# Patient Record
Sex: FEMALE | Race: WHITE | Employment: OTHER | URBAN - METROPOLITAN AREA
[De-identification: names, ages, dates, MRNs, and addresses within clinical notes are randomized per-mention and may not be internally consistent; named-entity substitution may affect disease eponyms.]

---

## 2018-08-04 ENCOUNTER — APPOINTMENT (OUTPATIENT)
Dept: GENERAL RADIOLOGY | Age: 83
End: 2018-08-04
Payer: COMMERCIAL

## 2018-08-04 ENCOUNTER — HOSPITAL ENCOUNTER (EMERGENCY)
Age: 83
Discharge: HOME | End: 2018-08-04
Attending: EMERGENCY MEDICINE
Payer: COMMERCIAL

## 2018-08-04 VITALS
BODY MASS INDEX: 36.82 KG/M2 | HEART RATE: 69 BPM | OXYGEN SATURATION: 99 % | HEIGHT: 61 IN | RESPIRATION RATE: 14 BRPM | TEMPERATURE: 99.2 F | DIASTOLIC BLOOD PRESSURE: 72 MMHG | SYSTOLIC BLOOD PRESSURE: 169 MMHG | WEIGHT: 195 LBS

## 2018-08-04 DIAGNOSIS — S42.402A LEFT ELBOW FRACTURE, CLOSED, INITIAL ENCOUNTER: Primary | ICD-10-CM

## 2018-08-04 PROCEDURE — 99283 EMERGENCY DEPT VISIT LOW MDM: CPT

## 2018-08-04 PROCEDURE — 90715 TDAP VACCINE 7 YRS/> IM: CPT | Performed by: EMERGENCY MEDICINE

## 2018-08-04 PROCEDURE — 6360000002 HC RX W HCPCS: Performed by: PREVENTIVE MEDICINE

## 2018-08-04 PROCEDURE — 73080 X-RAY EXAM OF ELBOW: CPT

## 2018-08-04 PROCEDURE — 6360000002 HC RX W HCPCS: Performed by: EMERGENCY MEDICINE

## 2018-08-04 PROCEDURE — 90471 IMMUNIZATION ADMIN: CPT | Performed by: EMERGENCY MEDICINE

## 2018-08-04 PROCEDURE — 29105 APPLICATION LONG ARM SPLINT: CPT

## 2018-08-04 PROCEDURE — 96372 THER/PROPH/DIAG INJ SC/IM: CPT

## 2018-08-04 RX ORDER — KETOROLAC TROMETHAMINE 30 MG/ML
15 INJECTION, SOLUTION INTRAMUSCULAR; INTRAVENOUS ONCE
Status: COMPLETED | OUTPATIENT
Start: 2018-08-04 | End: 2018-08-04

## 2018-08-04 RX ORDER — KETOROLAC TROMETHAMINE 10 MG/1
10 TABLET, FILM COATED ORAL EVERY 6 HOURS PRN
Qty: 10 TABLET | Refills: 0 | Status: ON HOLD | OUTPATIENT
Start: 2018-08-04 | End: 2019-06-28 | Stop reason: HOSPADM

## 2018-08-04 RX ADMIN — KETOROLAC TROMETHAMINE 15 MG: 30 INJECTION, SOLUTION INTRAMUSCULAR at 15:47

## 2018-08-04 RX ADMIN — TETANUS TOXOID, REDUCED DIPHTHERIA TOXOID AND ACELLULAR PERTUSSIS VACCINE, ADSORBED 0.5 ML: 5; 2.5; 8; 8; 2.5 SUSPENSION INTRAMUSCULAR at 16:18

## 2018-08-04 ASSESSMENT — ENCOUNTER SYMPTOMS
ALLERGIC/IMMUNOLOGIC NEGATIVE: 1
CONSTIPATION: 0
SHORTNESS OF BREATH: 0
CHEST TIGHTNESS: 0
NAUSEA: 0
ABDOMINAL PAIN: 0
VOMITING: 0
COUGH: 0
DIARRHEA: 0

## 2018-08-04 ASSESSMENT — PAIN DESCRIPTION - ORIENTATION: ORIENTATION: LEFT

## 2018-08-04 ASSESSMENT — PAIN DESCRIPTION - LOCATION
LOCATION: ARM
LOCATION: ARM

## 2018-08-04 ASSESSMENT — PAIN DESCRIPTION - PAIN TYPE
TYPE: ACUTE PAIN
TYPE: ACUTE PAIN

## 2018-08-04 ASSESSMENT — PAIN SCALES - GENERAL
PAINLEVEL_OUTOF10: 5
PAINLEVEL_OUTOF10: 10
PAINLEVEL_OUTOF10: 2

## 2018-08-04 NOTE — ED PROVIDER NOTES
80-year-old female with history of recent fall approximately 24 hours prior to arrival. The patient and the patient's son is the primary historians. They report the patient had fallen out of bed onto a hardwood floor yesterday landing directly on her left elbow. She's taken Tylenol for her pain but reports this has not significantly improved her symptoms. Currently localizes pain to the left elbow and notes a small bruise in that area. She has pain with flexion and extension of the elbow but is able to move the joint. She reports no subjective paresthesias at this time. Patient states she did not hit her head. Review of Systems   Constitutional: Negative for chills and fever. HENT: Negative for congestion. Eyes: Negative for visual disturbance. Respiratory: Negative for cough, chest tightness and shortness of breath. Cardiovascular: Negative for chest pain, palpitations and leg swelling. Gastrointestinal: Negative for abdominal pain, constipation, diarrhea, nausea and vomiting. Endocrine: Negative. Genitourinary: Negative for dysuria, frequency, hematuria and urgency. Musculoskeletal: Positive for arthralgias. Skin: Negative. Allergic/Immunologic: Negative. Neurological: Negative for dizziness, weakness and headaches. Hematological: Negative. Psychiatric/Behavioral: Negative. Physical Exam   Constitutional: She is oriented to person, place, and time. She appears well-developed and well-nourished. No distress. HENT:   Head: Normocephalic and atraumatic. Right Ear: External ear normal.   Left Ear: External ear normal.   Nose: Nose normal.   Mouth/Throat: Oropharynx is clear and moist. No oropharyngeal exudate. Eyes: Conjunctivae and EOM are normal. Pupils are equal, round, and reactive to light. Right eye exhibits no discharge. Left eye exhibits no discharge. Neck: Normal range of motion. Neck supple. No JVD present. No tracheal deviation present.  No thyromegaly present. Cardiovascular: Normal rate, regular rhythm, normal heart sounds and intact distal pulses. Exam reveals no gallop and no friction rub. No murmur heard. Pulmonary/Chest: Effort normal and breath sounds normal. No respiratory distress. She has no wheezes. She has no rales. Abdominal: Soft. Bowel sounds are normal. She exhibits no distension. There is no tenderness. There is no rebound and no guarding. Musculoskeletal: Normal range of motion. She exhibits no edema. Lymphadenopathy:     She has no cervical adenopathy. Neurological: She is alert and oriented to person, place, and time. Skin: Skin is warm and dry. She is not diaphoretic. Psychiatric: She has a normal mood and affect. Her behavior is normal. Judgment and thought content normal.   Vitals reviewed. Procedures    MDM  Number of Diagnoses or Management Options  Diagnosis management comments: 20-year-old female status post fall onto her left elbow. We'll obtain her imaging studies and pain medication at this time. ED Course as of Aug 04 1652   Sat Aug 04, 2018   1611 ATTENDING PROVIDER ATTESTATION:     I have personally performed and/or participated in the history, exam, medical decision making, and procedures and agree with all pertinent clinical information unless otherwise noted. I have also reviewed and agree with the past medical, family and social history unless otherwise noted. I have discussed this patient in detail with the resident and provided the instruction and education regarding the evidence-based evaluation and treatment of elbow pain. History: Patient fell yesterday striking her left elbow. She was initially okay but, has since developed worsening pain and a bit of swelling to that left elbow. No other areas of pain or trauma. My findings: Shilo Sanchez is a 80 y.o. female whom is in no distress. Physical exam reveals mild to moderate tenderness over the left posterior elbow.  Mild erythema over the prominence. Decreased range of motion secondary to pain. My plan: Symptomatic and supportive care. Xray    Electronically signed by Arielle Barksdale DO on 8/4/18 at 4:11 PM        [TG]      ED Course User Index  [TG] Arielle Barksdale DO       ED Course as of Aug 04 1652   Sat Aug 04, 2018   1611 ATTENDING PROVIDER ATTESTATION:     I have personally performed and/or participated in the history, exam, medical decision making, and procedures and agree with all pertinent clinical information unless otherwise noted. I have also reviewed and agree with the past medical, family and social history unless otherwise noted. I have discussed this patient in detail with the resident and provided the instruction and education regarding the evidence-based evaluation and treatment of elbow pain. History: Patient fell yesterday striking her left elbow. She was initially okay but, has since developed worsening pain and a bit of swelling to that left elbow. No other areas of pain or trauma. My findings: Beth Lainez is a 80 y.o. female whom is in no distress. Physical exam reveals mild to moderate tenderness over the left posterior elbow. Mild erythema over the prominence. Decreased range of motion secondary to pain. My plan: Symptomatic and supportive care. Xray    Electronically signed by Arielle Barksdale DO on 8/4/18 at 4:11 PM        [TG]      ED Course User Index  [TG] Arielle Barksdale DO       --------------------------------------------- PAST HISTORY ---------------------------------------------  Past Medical History:  has no past medical history on file. Past Surgical History:  has no past surgical history on file. Social History:  reports that she has never smoked. She has never used smokeless tobacco. She reports that she does not use drugs. Family History: family history is not on file. The patients home medications have been reviewed.     Allergies: Celexa [citalopram hydrobromide]; Ciprofloxacin; Keflex [cephalexin]; Sulfa antibiotics; and Victoza [liraglutide]    -------------------------------------------------- RESULTS -------------------------------------------------  Labs:  No results found for this visit on 08/04/18. Radiology:  XR ELBOW LEFT (MIN 3 VIEWS)   Final Result   Fracture of the radial head             ------------------------- NURSING NOTES AND VITALS REVIEWED ---------------------------  Date / Time Roomed:  8/4/2018  3:24 PM  ED Bed Assignment:  23/23    The nursing notes within the ED encounter and vital signs as below have been reviewed. BP (!) 169/72   Pulse 69   Temp 99.2 °F (37.3 °C) (Oral)   Resp 14   Ht 5' 1\" (1.549 m)   Wt 195 lb (88.5 kg)   SpO2 99%   BMI 36.84 kg/m²   Oxygen Saturation Interpretation: Normal      ------------------------------------------ PROGRESS NOTES ------------------------------------------  4:52 PM  I have spoken with the patient and family and discussed todays results, in addition to providing specific details for the plan of care and counseling regarding the diagnosis and prognosis. Their questions are answered at this time and they are agreeable with the plan. I discussed at length with them reasons for immediate return here for re evaluation. They will followup with their primary care physician and orthopedic physician by calling their office on Monday.      --------------------------------- ADDITIONAL PROVIDER NOTES ---------------------------------  At this time the patient is without objective evidence of an acute process requiring hospitalization or inpatient management. They have remained hemodynamically stable throughout their entire ED visit and are stable for discharge with outpatient follow-up. The plan has been discussed in detail and they are aware of the specific conditions for emergent return, as well as the importance of follow-up.       New Prescriptions    KETOROLAC (TORADOL) 10 MG TABLET Take 1 tablet by mouth every 6 hours as needed for Pain       Diagnosis:  1. Left elbow fracture, closed, initial encounter        Disposition:  Patient's disposition: Discharge to home  Patient's condition is stable.        Arielle Barksdale [place]  08/04/18 0728

## 2018-09-13 ENCOUNTER — OFFICE VISIT (OUTPATIENT)
Dept: ORTHOPEDIC SURGERY | Age: 83
End: 2018-09-13
Payer: COMMERCIAL

## 2018-09-13 VITALS
DIASTOLIC BLOOD PRESSURE: 64 MMHG | HEART RATE: 57 BPM | TEMPERATURE: 98.4 F | WEIGHT: 193 LBS | SYSTOLIC BLOOD PRESSURE: 128 MMHG | BODY MASS INDEX: 36.44 KG/M2 | HEIGHT: 61 IN

## 2018-09-13 DIAGNOSIS — S52.125A CLOSED NONDISPLACED FRACTURE OF HEAD OF LEFT RADIUS, INITIAL ENCOUNTER: Primary | ICD-10-CM

## 2018-09-13 PROCEDURE — 99203 OFFICE O/P NEW LOW 30 MIN: CPT | Performed by: ORTHOPAEDIC SURGERY

## 2019-04-10 LAB
BASOPHILS ABSOLUTE: NORMAL
BASOPHILS RELATIVE PERCENT: NORMAL
EOSINOPHILS ABSOLUTE: NORMAL
EOSINOPHILS RELATIVE PERCENT: NORMAL
HCT VFR BLD CALC: NORMAL %
HEMOGLOBIN: NORMAL
LYMPHOCYTES ABSOLUTE: NORMAL
LYMPHOCYTES RELATIVE PERCENT: NORMAL
MCH RBC QN AUTO: NORMAL PG
MCHC RBC AUTO-ENTMCNC: NORMAL G/DL
MCV RBC AUTO: NORMAL FL
MONOCYTES ABSOLUTE: NORMAL
MONOCYTES RELATIVE PERCENT: NORMAL
NEUTROPHILS ABSOLUTE: NORMAL
NEUTROPHILS RELATIVE PERCENT: NORMAL
PLATELET # BLD: NORMAL 10*3/UL
PMV BLD AUTO: NORMAL FL
RBC # BLD: NORMAL 10*6/UL
WBC # BLD: NORMAL 10*3/UL

## 2019-04-11 LAB
AVERAGE GLUCOSE: 143
HBA1C MFR BLD: 6.6 %

## 2019-06-22 ENCOUNTER — APPOINTMENT (OUTPATIENT)
Dept: CT IMAGING | Age: 84
End: 2019-06-22
Payer: MEDICARE

## 2019-06-22 ENCOUNTER — HOSPITAL ENCOUNTER (EMERGENCY)
Age: 84
Discharge: HOME | End: 2019-06-22
Attending: EMERGENCY MEDICINE
Payer: MEDICARE

## 2019-06-22 VITALS
OXYGEN SATURATION: 95 % | SYSTOLIC BLOOD PRESSURE: 162 MMHG | BODY MASS INDEX: 34.36 KG/M2 | DIASTOLIC BLOOD PRESSURE: 87 MMHG | WEIGHT: 182 LBS | HEART RATE: 85 BPM | HEIGHT: 61 IN | TEMPERATURE: 98.3 F | RESPIRATION RATE: 16 BRPM

## 2019-06-22 DIAGNOSIS — E86.0 DEHYDRATION, MILD: ICD-10-CM

## 2019-06-22 DIAGNOSIS — R42 DIZZINESS: ICD-10-CM

## 2019-06-22 DIAGNOSIS — J01.30 ACUTE SPHENOIDAL SINUSITIS, RECURRENCE NOT SPECIFIED: ICD-10-CM

## 2019-06-22 DIAGNOSIS — H81.10 BENIGN PAROXYSMAL POSITIONAL VERTIGO, UNSPECIFIED LATERALITY: Primary | ICD-10-CM

## 2019-06-22 LAB
ANION GAP SERPL CALCULATED.3IONS-SCNC: 12 MMOL/L (ref 7–16)
BASOPHILS ABSOLUTE: 0.07 E9/L (ref 0–0.2)
BASOPHILS RELATIVE PERCENT: 0.7 % (ref 0–2)
BILIRUBIN URINE: NEGATIVE
BLOOD, URINE: NEGATIVE
BUN BLDV-MCNC: 17 MG/DL (ref 8–23)
CALCIUM SERPL-MCNC: 9.6 MG/DL (ref 8.6–10.2)
CHLORIDE BLD-SCNC: 101 MMOL/L (ref 98–107)
CLARITY: CLEAR
CO2: 26 MMOL/L (ref 22–29)
COLOR: YELLOW
CREAT SERPL-MCNC: 0.8 MG/DL (ref 0.5–1)
EOSINOPHILS ABSOLUTE: 0.15 E9/L (ref 0.05–0.5)
EOSINOPHILS RELATIVE PERCENT: 1.6 % (ref 0–6)
GFR AFRICAN AMERICAN: >60
GFR NON-AFRICAN AMERICAN: >60 ML/MIN/1.73
GLUCOSE BLD-MCNC: 88 MG/DL (ref 74–99)
GLUCOSE URINE: NEGATIVE MG/DL
HCT VFR BLD CALC: 34.7 % (ref 34–48)
HEMOGLOBIN: 12.3 G/DL (ref 11.5–15.5)
IMMATURE GRANULOCYTES #: 0.02 E9/L
IMMATURE GRANULOCYTES %: 0.2 % (ref 0–5)
KETONES, URINE: NEGATIVE MG/DL
LEUKOCYTE ESTERASE, URINE: NEGATIVE
LYMPHOCYTES ABSOLUTE: 3.53 E9/L (ref 1.5–4)
LYMPHOCYTES RELATIVE PERCENT: 36.5 % (ref 20–42)
MCH RBC QN AUTO: 32.6 PG (ref 26–35)
MCHC RBC AUTO-ENTMCNC: 35.4 % (ref 32–34.5)
MCV RBC AUTO: 92 FL (ref 80–99.9)
MONOCYTES ABSOLUTE: 0.83 E9/L (ref 0.1–0.95)
MONOCYTES RELATIVE PERCENT: 8.6 % (ref 2–12)
NEUTROPHILS ABSOLUTE: 5.07 E9/L (ref 1.8–7.3)
NEUTROPHILS RELATIVE PERCENT: 52.4 % (ref 43–80)
NITRITE, URINE: NEGATIVE
PDW BLD-RTO: 12.9 FL (ref 11.5–15)
PH UA: 5.5 (ref 5–9)
PLATELET # BLD: 164 E9/L (ref 130–450)
PMV BLD AUTO: 11.8 FL (ref 7–12)
POTASSIUM SERPL-SCNC: 4.1 MMOL/L (ref 3.5–5)
PROTEIN UA: NEGATIVE MG/DL
RBC # BLD: 3.77 E12/L (ref 3.5–5.5)
SODIUM BLD-SCNC: 139 MMOL/L (ref 132–146)
SPECIFIC GRAVITY UA: <=1.005 (ref 1–1.03)
TROPONIN: <0.01 NG/ML (ref 0–0.03)
UROBILINOGEN, URINE: 0.2 E.U./DL
WBC # BLD: 9.7 E9/L (ref 4.5–11.5)

## 2019-06-22 PROCEDURE — 81003 URINALYSIS AUTO W/O SCOPE: CPT

## 2019-06-22 PROCEDURE — 80048 BASIC METABOLIC PNL TOTAL CA: CPT

## 2019-06-22 PROCEDURE — 85025 COMPLETE CBC W/AUTO DIFF WBC: CPT

## 2019-06-22 PROCEDURE — 70450 CT HEAD/BRAIN W/O DYE: CPT

## 2019-06-22 PROCEDURE — 6370000000 HC RX 637 (ALT 250 FOR IP): Performed by: EMERGENCY MEDICINE

## 2019-06-22 PROCEDURE — 99284 EMERGENCY DEPT VISIT MOD MDM: CPT

## 2019-06-22 PROCEDURE — 2580000003 HC RX 258: Performed by: EMERGENCY MEDICINE

## 2019-06-22 PROCEDURE — 84484 ASSAY OF TROPONIN QUANT: CPT

## 2019-06-22 PROCEDURE — 96360 HYDRATION IV INFUSION INIT: CPT

## 2019-06-22 RX ORDER — SODIUM CHLORIDE 9 MG/ML
500 INJECTION, SOLUTION INTRAVENOUS CONTINUOUS
Status: DISCONTINUED | OUTPATIENT
Start: 2019-06-22 | End: 2019-06-22 | Stop reason: HOSPADM

## 2019-06-22 RX ORDER — MECLIZINE HCL 12.5 MG/1
25 TABLET ORAL ONCE
Status: DISCONTINUED | OUTPATIENT
Start: 2019-06-22 | End: 2019-06-22

## 2019-06-22 RX ORDER — DOXYCYCLINE HYCLATE 100 MG
100 TABLET ORAL 2 TIMES DAILY
Qty: 20 TABLET | Refills: 0 | Status: SHIPPED | OUTPATIENT
Start: 2019-06-22 | End: 2019-07-02

## 2019-06-22 RX ORDER — ONDANSETRON 4 MG/1
8 TABLET, ORALLY DISINTEGRATING ORAL ONCE
Status: DISCONTINUED | OUTPATIENT
Start: 2019-06-22 | End: 2019-06-22 | Stop reason: HOSPADM

## 2019-06-22 RX ORDER — DOXYCYCLINE HYCLATE 100 MG/1
100 CAPSULE ORAL ONCE
Status: COMPLETED | OUTPATIENT
Start: 2019-06-22 | End: 2019-06-22

## 2019-06-22 RX ADMIN — DOXYCYCLINE HYCLATE 100 MG: 100 CAPSULE ORAL at 20:27

## 2019-06-22 RX ADMIN — SODIUM CHLORIDE 500 ML: 9 INJECTION, SOLUTION INTRAVENOUS at 19:18

## 2019-06-22 NOTE — ED NOTES
Bed: 12  Expected date:   Expected time:   Means of arrival:   Comments:  Wallace Marie, RN  06/22/19 0662

## 2019-06-22 NOTE — ED NOTES
Bed:  OGMEZ-03  Expected date:   Expected time:   Means of arrival:   Comments:  Blossom [place] RN  06/22/19 [id number]

## 2019-06-22 NOTE — ED PROVIDER NOTES
HPI:  6/22/19, Time: 6:54 PM        Blu Napier is a 80 y.o. female presenting to the ED for dizziness beginning 1 hour ago. The complaint has been intermittent, mild in severity, and worsened by standing occasionally. Pt. Has taken Antivert at home without any improvement of her symptoms. Pt. Has not had any reported chest heaviness/pressure/tightness. No reported fever/chills, no focal extremity weakness, no vomiting, no other complaints. No other complaints reported. No aggravating nor relieving factors reported. Family members state that the patient has occasionally complained of frontal headaches. No neck stiffness nor rashes are reported. Review of Systems:   Pertinent positives and negatives are stated within HPI, all other systems reviewed and are negative.      --------------------------------------------- PAST HISTORY ---------------------------------------------  Past Medical History:  has a past medical history of Diabetes mellitus ([facility] [address] Past Surgical History:  has no past surgical history on file. Social History:  reports that she has never smoked. She has never used smokeless tobacco. She reports that she does not drink alcohol or use drugs. Family History: family history is not on file. The patients home medications have been reviewed. Allergies: Celexa [citalopram hydrobromide]; Ciprofloxacin; Keflex [cephalexin];  Sulfa antibiotics; and Victoza [liraglutide]    -------------------------------------------------- RESULTS -------------------------------------------------  All laboratory and radiology results have been personally reviewed by myself   LABS:  Results for orders placed or performed during the hospital encounter of 06/22/19   Troponin   Result Value Ref Range    Troponin <0.01 0.00 - 0.03 ng/mL   Basic Metabolic Panel   Result Value Ref Range    Sodium 139 132 - 146 mmol/L    Potassium 4.1 3.5 - 5.0 mmol/L    Chloride 101 98 - 107 mmol/L    CO2 26 22 - 29 mmol/L    Anion Gap 12 7 - 16 mmol/L    Glucose 88 74 - 99 mg/dL    BUN 17 8 - 23 mg/dL    CREATININE 0.8 0.5 - 1.0 mg/dL    GFR Non-African American >60 >=60 mL/min/1.73    GFR African American >60     Calcium 9.6 8.6 - 10.2 mg/dL   CBC Auto Differential   Result Value Ref Range    WBC 9.7 4.5 - 11.5 E9/L    RBC 3.77 3.50 - 5.50 E12/L    Hemoglobin 12.3 11.5 - 15.5 g/dL    Hematocrit 34.7 34.0 - 48.0 %    MCV 92.0 80.0 - 99.9 fL    MCH 32.6 26.0 - 35.0 pg    MCHC 35.4 (H) 32.0 - 34.5 %    RDW 12.9 11.5 - 15.0 fL    Platelets 380 487 - 161 E9/L    MPV 11.8 7.0 - 12.0 fL    Neutrophils % 52.4 43.0 - 80.0 %    Immature Granulocytes % 0.2 0.0 - 5.0 %    Lymphocytes % 36.5 20.0 - 42.0 %    Monocytes % 8.6 2.0 - 12.0 %    Eosinophils % 1.6 0.0 - 6.0 %    Basophils % 0.7 0.0 - 2.0 %    Neutrophils # 5.07 1.80 - 7.30 E9/L    Immature Granulocytes # 0.02 E9/L    Lymphocytes # 3.53 1.50 - 4.00 E9/L    Monocytes # 0.83 0.10 - 0.95 E9/L    Eosinophils # 0.15 0.05 - 0.50 E9/L    Basophils # 0.07 0.00 - 0.20 E9/L   Urinalysis   Result Value Ref Range    Color, UA Yellow Straw/Yellow    Clarity, UA Clear Clear    Glucose, Ur Negative Negative mg/dL    Bilirubin Urine Negative Negative    Ketones, Urine Negative Negative mg/dL    Specific Gravity, UA <=1.005 1.005 - 1.030    Blood, Urine Negative Negative    pH, UA 5.5 5.0 - 9.0    Protein, UA Negative Negative mg/dL    Urobilinogen, Urine 0.2 <2.0 E.U./dL    Nitrite, Urine Negative Negative    Leukocyte Esterase, Urine Negative Negative       RADIOLOGY:  Interpreted by Radiologist.  CT HEAD WO CONTRAST   Final Result   Fluid within the sphenoid sinus compatible with sinusitis in the   appropriate clinical setting.             ------------------------- NURSING NOTES AND VITALS REVIEWED ---------------------------   The nursing notes within the ED encounter and vital signs as below have been reviewed.    BP (!) 162/87   Pulse 85   Temp 98.3 °F (36.8 °C) (Oral)   Resp 16   Ht 5' recurrence not specified        DISPOSITION  Disposition: Discharge to home  Patient condition is stable      NOTE: This report was transcribed using voice recognition software.  Every effort was made to ensure accuracy; however, inadvertent computerized transcription errors may be present        Josephine Phoenix, MD  06/22/19 0350

## 2019-06-26 ENCOUNTER — HOSPITAL ENCOUNTER (OUTPATIENT)
Age: 84
Setting detail: OBSERVATION
Discharge: HOME | End: 2019-06-28
Attending: EMERGENCY MEDICINE | Admitting: INTERNAL MEDICINE
Payer: MEDICARE

## 2019-06-26 ENCOUNTER — APPOINTMENT (OUTPATIENT)
Dept: CT IMAGING | Age: 84
End: 2019-06-26
Payer: MEDICARE

## 2019-06-26 ENCOUNTER — APPOINTMENT (OUTPATIENT)
Dept: GENERAL RADIOLOGY | Age: 84
End: 2019-06-26
Payer: MEDICARE

## 2019-06-26 ENCOUNTER — APPOINTMENT (OUTPATIENT)
Dept: ULTRASOUND IMAGING | Age: 84
End: 2019-06-26
Payer: MEDICARE

## 2019-06-26 DIAGNOSIS — E08.01 DIABETES MELLITUS DUE TO UNDERLYING CONDITION WITH HYPEROSMOLARITY AND COMA, WITH LONG-TERM CURRENT USE OF INSULIN (HCC): ICD-10-CM

## 2019-06-26 DIAGNOSIS — R42 DIZZINESS: Primary | ICD-10-CM

## 2019-06-26 DIAGNOSIS — Z79.4 DIABETES MELLITUS DUE TO UNDERLYING CONDITION WITH HYPEROSMOLARITY AND COMA, WITH LONG-TERM CURRENT USE OF INSULIN (HCC): ICD-10-CM

## 2019-06-26 DIAGNOSIS — R42 LIGHTHEADEDNESS: ICD-10-CM

## 2019-06-26 DIAGNOSIS — F41.0 PANIC DISORDER: ICD-10-CM

## 2019-06-26 DIAGNOSIS — R42 VERTIGO: ICD-10-CM

## 2019-06-26 DIAGNOSIS — R51.9 NONINTRACTABLE HEADACHE, UNSPECIFIED CHRONICITY PATTERN, UNSPECIFIED HEADACHE TYPE: ICD-10-CM

## 2019-06-26 PROBLEM — I35.1 AORTIC REGURGITATION: Status: ACTIVE | Noted: 2017-01-01

## 2019-06-26 LAB
ALBUMIN SERPL-MCNC: 4.4 G/DL (ref 3.5–5.2)
ALP BLD-CCNC: 51 U/L (ref 35–104)
ALT SERPL-CCNC: 16 U/L (ref 0–32)
ANION GAP SERPL CALCULATED.3IONS-SCNC: 14 MMOL/L (ref 7–16)
AST SERPL-CCNC: 26 U/L (ref 0–31)
BASOPHILS ABSOLUTE: 0.09 E9/L (ref 0–0.2)
BASOPHILS RELATIVE PERCENT: 0.9 % (ref 0–2)
BILIRUB SERPL-MCNC: 0.3 MG/DL (ref 0–1.2)
BUN BLDV-MCNC: 20 MG/DL (ref 8–23)
CALCIUM SERPL-MCNC: 9.4 MG/DL (ref 8.6–10.2)
CHLORIDE BLD-SCNC: 102 MMOL/L (ref 98–107)
CHP ED QC CHECK: NORMAL
CO2: 25 MMOL/L (ref 22–29)
CREAT SERPL-MCNC: 0.9 MG/DL (ref 0.5–1)
EKG ATRIAL RATE: 78 BPM
EKG P AXIS: 15 DEGREES
EKG P-R INTERVAL: 248 MS
EKG Q-T INTERVAL: 392 MS
EKG QRS DURATION: 100 MS
EKG QTC CALCULATION (BAZETT): 446 MS
EKG R AXIS: -42 DEGREES
EKG T AXIS: 29 DEGREES
EKG VENTRICULAR RATE: 78 BPM
EOSINOPHILS ABSOLUTE: 0.14 E9/L (ref 0.05–0.5)
EOSINOPHILS RELATIVE PERCENT: 1.4 % (ref 0–6)
GFR AFRICAN AMERICAN: >60
GFR NON-AFRICAN AMERICAN: 59 ML/MIN/1.73
GLUCOSE BLD-MCNC: 142 MG/DL
GLUCOSE BLD-MCNC: 146 MG/DL (ref 74–99)
HCT VFR BLD CALC: 35.8 % (ref 34–48)
HEMOGLOBIN: 12.2 G/DL (ref 11.5–15.5)
IMMATURE GRANULOCYTES #: 0.02 E9/L
IMMATURE GRANULOCYTES %: 0.2 % (ref 0–5)
LACTIC ACID, SEPSIS: 2.3 MMOL/L (ref 0.5–1.9)
LYMPHOCYTES ABSOLUTE: 3.25 E9/L (ref 1.5–4)
LYMPHOCYTES RELATIVE PERCENT: 33.5 % (ref 20–42)
MCH RBC QN AUTO: 31.9 PG (ref 26–35)
MCHC RBC AUTO-ENTMCNC: 34.1 % (ref 32–34.5)
MCV RBC AUTO: 93.5 FL (ref 80–99.9)
METER GLUCOSE: 142 MG/DL (ref 74–99)
METER GLUCOSE: 156 MG/DL (ref 74–99)
MONOCYTES ABSOLUTE: 0.73 E9/L (ref 0.1–0.95)
MONOCYTES RELATIVE PERCENT: 7.5 % (ref 2–12)
NEUTROPHILS ABSOLUTE: 5.48 E9/L (ref 1.8–7.3)
NEUTROPHILS RELATIVE PERCENT: 56.5 % (ref 43–80)
PDW BLD-RTO: 12.8 FL (ref 11.5–15)
PLATELET # BLD: 183 E9/L (ref 130–450)
PMV BLD AUTO: 12.2 FL (ref 7–12)
POTASSIUM SERPL-SCNC: 4.2 MMOL/L (ref 3.5–5)
PRO-BNP: 79 PG/ML (ref 0–450)
RBC # BLD: 3.83 E12/L (ref 3.5–5.5)
SEDIMENTATION RATE, ERYTHROCYTE: 32 MM/HR (ref 0–20)
SODIUM BLD-SCNC: 141 MMOL/L (ref 132–146)
TOTAL CK: 99 U/L (ref 20–180)
TOTAL PROTEIN: 7.8 G/DL (ref 6.4–8.3)
TROPONIN: <0.01 NG/ML (ref 0–0.03)
WBC # BLD: 9.7 E9/L (ref 4.5–11.5)

## 2019-06-26 PROCEDURE — 93880 EXTRACRANIAL BILAT STUDY: CPT

## 2019-06-26 PROCEDURE — 84484 ASSAY OF TROPONIN QUANT: CPT

## 2019-06-26 PROCEDURE — 96374 THER/PROPH/DIAG INJ IV PUSH: CPT

## 2019-06-26 PROCEDURE — 36415 COLL VENOUS BLD VENIPUNCTURE: CPT

## 2019-06-26 PROCEDURE — 86304 IMMUNOASSAY TUMOR CA 125: CPT

## 2019-06-26 PROCEDURE — 81001 URINALYSIS AUTO W/SCOPE: CPT

## 2019-06-26 PROCEDURE — 84145 PROCALCITONIN (PCT): CPT

## 2019-06-26 PROCEDURE — 6360000004 HC RX CONTRAST MEDICATION: Performed by: RADIOLOGY

## 2019-06-26 PROCEDURE — 82962 GLUCOSE BLOOD TEST: CPT

## 2019-06-26 PROCEDURE — 87633 RESP VIRUS 12-25 TARGETS: CPT

## 2019-06-26 PROCEDURE — 6360000002 HC RX W HCPCS: Performed by: EMERGENCY MEDICINE

## 2019-06-26 PROCEDURE — 86301 IMMUNOASSAY TUMOR CA 19-9: CPT

## 2019-06-26 PROCEDURE — G0378 HOSPITAL OBSERVATION PER HR: HCPCS

## 2019-06-26 PROCEDURE — 87581 M.PNEUMON DNA AMP PROBE: CPT

## 2019-06-26 PROCEDURE — 82378 CARCINOEMBRYONIC ANTIGEN: CPT

## 2019-06-26 PROCEDURE — 71045 X-RAY EXAM CHEST 1 VIEW: CPT

## 2019-06-26 PROCEDURE — 6360000002 HC RX W HCPCS: Performed by: INTERNAL MEDICINE

## 2019-06-26 PROCEDURE — 83605 ASSAY OF LACTIC ACID: CPT

## 2019-06-26 PROCEDURE — 93010 ELECTROCARDIOGRAM REPORT: CPT | Performed by: INTERNAL MEDICINE

## 2019-06-26 PROCEDURE — 2580000003 HC RX 258: Performed by: EMERGENCY MEDICINE

## 2019-06-26 PROCEDURE — 93005 ELECTROCARDIOGRAM TRACING: CPT | Performed by: EMERGENCY MEDICINE

## 2019-06-26 PROCEDURE — 2580000003 HC RX 258: Performed by: INTERNAL MEDICINE

## 2019-06-26 PROCEDURE — 86140 C-REACTIVE PROTEIN: CPT

## 2019-06-26 PROCEDURE — 87088 URINE BACTERIA CULTURE: CPT

## 2019-06-26 PROCEDURE — 94760 N-INVAS EAR/PLS OXIMETRY 1: CPT

## 2019-06-26 PROCEDURE — 83036 HEMOGLOBIN GLYCOSYLATED A1C: CPT

## 2019-06-26 PROCEDURE — 80053 COMPREHEN METABOLIC PANEL: CPT

## 2019-06-26 PROCEDURE — 87486 CHLMYD PNEUM DNA AMP PROBE: CPT

## 2019-06-26 PROCEDURE — 85651 RBC SED RATE NONAUTOMATED: CPT

## 2019-06-26 PROCEDURE — 82550 ASSAY OF CK (CPK): CPT

## 2019-06-26 PROCEDURE — 2580000003 HC RX 258: Performed by: RADIOLOGY

## 2019-06-26 PROCEDURE — 82746 ASSAY OF FOLIC ACID SERUM: CPT

## 2019-06-26 PROCEDURE — 83935 ASSAY OF URINE OSMOLALITY: CPT

## 2019-06-26 PROCEDURE — 87040 BLOOD CULTURE FOR BACTERIA: CPT

## 2019-06-26 PROCEDURE — 87798 DETECT AGENT NOS DNA AMP: CPT

## 2019-06-26 PROCEDURE — 83880 ASSAY OF NATRIURETIC PEPTIDE: CPT

## 2019-06-26 PROCEDURE — 71275 CT ANGIOGRAPHY CHEST: CPT

## 2019-06-26 PROCEDURE — 96372 THER/PROPH/DIAG INJ SC/IM: CPT

## 2019-06-26 PROCEDURE — 82607 VITAMIN B-12: CPT

## 2019-06-26 PROCEDURE — 85025 COMPLETE CBC W/AUTO DIFF WBC: CPT

## 2019-06-26 PROCEDURE — 6370000000 HC RX 637 (ALT 250 FOR IP): Performed by: INTERNAL MEDICINE

## 2019-06-26 PROCEDURE — 99285 EMERGENCY DEPT VISIT HI MDM: CPT

## 2019-06-26 RX ORDER — ALBUTEROL SULFATE 0.63 MG/3ML
1 SOLUTION RESPIRATORY (INHALATION) EVERY 12 HOURS PRN
COMMUNITY

## 2019-06-26 RX ORDER — FAMOTIDINE 20 MG/1
20 TABLET, FILM COATED ORAL 2 TIMES DAILY
COMMUNITY

## 2019-06-26 RX ORDER — SUCRALFATE 1 G/1
1 TABLET ORAL 2 TIMES DAILY
COMMUNITY

## 2019-06-26 RX ORDER — 0.9 % SODIUM CHLORIDE 0.9 %
500 INTRAVENOUS SOLUTION INTRAVENOUS ONCE
Status: COMPLETED | OUTPATIENT
Start: 2019-06-26 | End: 2019-06-27

## 2019-06-26 RX ORDER — ALBUTEROL SULFATE 0.63 MG/3ML
1 SOLUTION RESPIRATORY (INHALATION) EVERY 12 HOURS PRN
Status: DISCONTINUED | OUTPATIENT
Start: 2019-06-26 | End: 2019-06-28 | Stop reason: HOSPADM

## 2019-06-26 RX ORDER — BUMETANIDE 1 MG/1
0.5 TABLET ORAL DAILY
Status: DISCONTINUED | OUTPATIENT
Start: 2019-06-26 | End: 2019-06-28 | Stop reason: HOSPADM

## 2019-06-26 RX ORDER — ESCITALOPRAM OXALATE 10 MG/1
20 TABLET ORAL DAILY
Status: DISCONTINUED | OUTPATIENT
Start: 2019-06-26 | End: 2019-06-28 | Stop reason: HOSPADM

## 2019-06-26 RX ORDER — SODIUM CHLORIDE 0.9 % (FLUSH) 0.9 %
10 SYRINGE (ML) INJECTION PRN
Status: COMPLETED | OUTPATIENT
Start: 2019-06-26 | End: 2019-06-26

## 2019-06-26 RX ORDER — LORAZEPAM 2 MG/ML
0.5 INJECTION INTRAMUSCULAR ONCE
Status: COMPLETED | OUTPATIENT
Start: 2019-06-26 | End: 2019-06-27

## 2019-06-26 RX ORDER — SUCRALFATE 1 G/1
1 TABLET ORAL 2 TIMES DAILY
Status: DISCONTINUED | OUTPATIENT
Start: 2019-06-26 | End: 2019-06-28 | Stop reason: HOSPADM

## 2019-06-26 RX ORDER — SODIUM CHLORIDE 9 MG/ML
INJECTION, SOLUTION INTRAVENOUS CONTINUOUS
Status: DISCONTINUED | OUTPATIENT
Start: 2019-06-26 | End: 2019-06-28 | Stop reason: HOSPADM

## 2019-06-26 RX ORDER — HYDROXYZINE HYDROCHLORIDE 10 MG/1
10 TABLET, FILM COATED ORAL DAILY
Status: DISCONTINUED | OUTPATIENT
Start: 2019-06-26 | End: 2019-06-28 | Stop reason: HOSPADM

## 2019-06-26 RX ORDER — ESCITALOPRAM OXALATE 20 MG/1
20 TABLET ORAL DAILY
COMMUNITY

## 2019-06-26 RX ORDER — FAMOTIDINE 20 MG/1
20 TABLET, FILM COATED ORAL 2 TIMES DAILY
Status: DISCONTINUED | OUTPATIENT
Start: 2019-06-26 | End: 2019-06-28 | Stop reason: HOSPADM

## 2019-06-26 RX ORDER — BUSPIRONE HYDROCHLORIDE 10 MG/1
10 TABLET ORAL 3 TIMES DAILY
Status: DISCONTINUED | OUTPATIENT
Start: 2019-06-26 | End: 2019-06-28 | Stop reason: HOSPADM

## 2019-06-26 RX ORDER — LORATADINE 10 MG/1
10 CAPSULE, LIQUID FILLED ORAL DAILY
COMMUNITY

## 2019-06-26 RX ORDER — KETOROLAC TROMETHAMINE 30 MG/ML
15 INJECTION, SOLUTION INTRAMUSCULAR; INTRAVENOUS ONCE
Status: COMPLETED | OUTPATIENT
Start: 2019-06-26 | End: 2019-06-26

## 2019-06-26 RX ORDER — DEXTROSE MONOHYDRATE 25 G/50ML
12.5 INJECTION, SOLUTION INTRAVENOUS PRN
Status: DISCONTINUED | OUTPATIENT
Start: 2019-06-26 | End: 2019-06-28 | Stop reason: HOSPADM

## 2019-06-26 RX ORDER — BUSPIRONE HYDROCHLORIDE 10 MG/1
10 TABLET ORAL 3 TIMES DAILY
COMMUNITY

## 2019-06-26 RX ORDER — HYDROXYZINE HYDROCHLORIDE 10 MG/1
10 TABLET, FILM COATED ORAL DAILY
COMMUNITY

## 2019-06-26 RX ORDER — L-METHYLFOLATE-ALGAE-VIT B12-B6 CAP 3-90.314-2-35 MG 3-90.314-2-35 MG
1 CAP ORAL DAILY
Status: DISCONTINUED | OUTPATIENT
Start: 2019-06-26 | End: 2019-06-28 | Stop reason: HOSPADM

## 2019-06-26 RX ORDER — BUMETANIDE 0.5 MG/1
0.5 TABLET ORAL DAILY
COMMUNITY

## 2019-06-26 RX ORDER — GLIPIZIDE 5 MG/1
5 TABLET ORAL
COMMUNITY

## 2019-06-26 RX ORDER — ALBUTEROL SULFATE 90 UG/1
2 AEROSOL, METERED RESPIRATORY (INHALATION) DAILY PRN
COMMUNITY

## 2019-06-26 RX ORDER — L-METHYLFOLATE-ALGAE-VIT B12-B6 CAP 3-90.314-2-35 MG 3-90.314-2-35 MG
1 CAP ORAL DAILY
COMMUNITY

## 2019-06-26 RX ORDER — ACETAMINOPHEN 325 MG/1
162.5 TABLET ORAL 3 TIMES DAILY
Status: DISCONTINUED | OUTPATIENT
Start: 2019-06-26 | End: 2019-06-28 | Stop reason: HOSPADM

## 2019-06-26 RX ORDER — GABAPENTIN 100 MG/1
100 CAPSULE ORAL 3 TIMES DAILY
COMMUNITY

## 2019-06-26 RX ORDER — TRAMADOL HYDROCHLORIDE 50 MG/1
25 TABLET ORAL 3 TIMES DAILY
Status: DISCONTINUED | OUTPATIENT
Start: 2019-06-26 | End: 2019-06-28 | Stop reason: HOSPADM

## 2019-06-26 RX ORDER — ONDANSETRON 2 MG/ML
4 INJECTION INTRAMUSCULAR; INTRAVENOUS EVERY 6 HOURS PRN
Status: DISCONTINUED | OUTPATIENT
Start: 2019-06-26 | End: 2019-06-28 | Stop reason: HOSPADM

## 2019-06-26 RX ORDER — DEXTROSE MONOHYDRATE 50 MG/ML
100 INJECTION, SOLUTION INTRAVENOUS PRN
Status: DISCONTINUED | OUTPATIENT
Start: 2019-06-26 | End: 2019-06-28 | Stop reason: HOSPADM

## 2019-06-26 RX ORDER — GLIPIZIDE 5 MG/1
5 TABLET ORAL
Status: DISCONTINUED | OUTPATIENT
Start: 2019-06-27 | End: 2019-06-28 | Stop reason: HOSPADM

## 2019-06-26 RX ORDER — NICOTINE POLACRILEX 4 MG
15 LOZENGE BUCCAL PRN
Status: DISCONTINUED | OUTPATIENT
Start: 2019-06-26 | End: 2019-06-28 | Stop reason: HOSPADM

## 2019-06-26 RX ORDER — CETIRIZINE HYDROCHLORIDE 10 MG/1
5 TABLET ORAL DAILY
Status: DISCONTINUED | OUTPATIENT
Start: 2019-06-26 | End: 2019-06-28 | Stop reason: HOSPADM

## 2019-06-26 RX ADMIN — BUSPIRONE HYDROCHLORIDE 10 MG: 10 TABLET ORAL at 22:36

## 2019-06-26 RX ADMIN — SODIUM CHLORIDE 500 ML: 9 INJECTION, SOLUTION INTRAVENOUS at 16:34

## 2019-06-26 RX ADMIN — IOPAMIDOL 70 ML: 755 INJECTION, SOLUTION INTRAVENOUS at 17:11

## 2019-06-26 RX ADMIN — SUCRALFATE 1 G: 1 TABLET ORAL at 22:28

## 2019-06-26 RX ADMIN — ACETAMINOPHEN 162.5 MG: 325 TABLET ORAL at 22:25

## 2019-06-26 RX ADMIN — TRAMADOL HYDROCHLORIDE 25 MG: 50 TABLET, FILM COATED ORAL at 22:25

## 2019-06-26 RX ADMIN — SODIUM CHLORIDE: 9 INJECTION, SOLUTION INTRAVENOUS at 22:51

## 2019-06-26 RX ADMIN — KETOROLAC TROMETHAMINE 15 MG: 30 INJECTION, SOLUTION INTRAMUSCULAR at 16:34

## 2019-06-26 RX ADMIN — ENOXAPARIN SODIUM 40 MG: 40 INJECTION SUBCUTANEOUS at 22:52

## 2019-06-26 RX ADMIN — Medication 10 ML: at 17:08

## 2019-06-26 ASSESSMENT — PAIN DESCRIPTION - ORIENTATION: ORIENTATION: ANTERIOR

## 2019-06-26 ASSESSMENT — PAIN SCALES - GENERAL
PAINLEVEL_OUTOF10: 4
PAINLEVEL_OUTOF10: 10
PAINLEVEL_OUTOF10: 10
PAINLEVEL_OUTOF10: 2
PAINLEVEL_OUTOF10: 0

## 2019-06-26 ASSESSMENT — ENCOUNTER SYMPTOMS
ABDOMINAL DISTENTION: 0
SORE THROAT: 0
WHEEZING: 0
VISUAL CHANGE: 0
DIARRHEA: 0
BLOOD IN STOOL: 0
CONSTIPATION: 0
VOMITING: 0
SHORTNESS OF BREATH: 0
NAUSEA: 0
EYE DISCHARGE: 0
RHINORRHEA: 0
EYE REDNESS: 0
EYE PAIN: 0
COUGH: 0
ABDOMINAL PAIN: 0
BACK PAIN: 0

## 2019-06-26 ASSESSMENT — PAIN DESCRIPTION - ONSET
ONSET: ON-GOING
ONSET: ON-GOING

## 2019-06-26 ASSESSMENT — PAIN DESCRIPTION - PAIN TYPE
TYPE: ACUTE PAIN;CHRONIC PAIN
TYPE: ACUTE PAIN;CHRONIC PAIN

## 2019-06-26 ASSESSMENT — PAIN DESCRIPTION - DESCRIPTORS
DESCRIPTORS: PRESSURE
DESCRIPTORS: ACHING;PRESSURE;DISCOMFORT

## 2019-06-26 ASSESSMENT — PAIN DESCRIPTION - FREQUENCY
FREQUENCY: CONTINUOUS
FREQUENCY: INTERMITTENT

## 2019-06-26 ASSESSMENT — PAIN DESCRIPTION - LOCATION
LOCATION: HEAD
LOCATION: HEAD

## 2019-06-26 ASSESSMENT — PAIN - FUNCTIONAL ASSESSMENT
PAIN_FUNCTIONAL_ASSESSMENT: ACTIVITIES ARE NOT PREVENTED
PAIN_FUNCTIONAL_ASSESSMENT: PREVENTS OR INTERFERES SOME ACTIVE ACTIVITIES AND ADLS

## 2019-06-26 NOTE — ED PROVIDER NOTES
Patient is an 80years old female with history of DM here with dizziness which she describes as lightheadedness and fatigue for the past 2 months. She denies room spinning sensation. Patient states nothing makes her symptoms worse or better. Patient states she also has had frontal dull headache for the past 3 to 4 days and that she was recently diagnosed with sinusitis and has been taking her doxycycline. She denies chest pain/pressure, shortness of breath, fever, chills, sweats, falls, injuries, change in vision/hearing, numbness/tingling, weakness, cough, abdominal pain, nausea/vomiting, diarrhea, burning with urination, urinary frequency changes, blood in stool or urine, history of heart problems, or history of strokes/TIAs. She denies history of cancer, recent travel/surgeries/hospitalizations, coughing of blood, or history of blood clots/PE/DVT. Patient states that she took Tylenol last night. Dizziness   Quality:  Lightheadedness  Duration:  2 months  Chronicity:  Recurrent  Relieved by:  Nothing  Worsened by:  Nothing  Ineffective treatments:  None tried  Associated symptoms: headaches    Associated symptoms: no blood in stool, no chest pain, no diarrhea, no hearing loss, no nausea, no palpitations, no shortness of breath, no syncope, no vision changes, no vomiting and no weakness    Risk factors: no heart disease, no hx of stroke and no hx of vertigo        Review of Systems   Constitutional: Positive for fatigue. Negative for chills and fever. HENT: Negative for ear pain, hearing loss, rhinorrhea and sore throat. Eyes: Negative for pain, discharge, redness and visual disturbance. Respiratory: Negative for cough, shortness of breath and wheezing. Cardiovascular: Negative for chest pain, palpitations and syncope. Gastrointestinal: Negative for abdominal distention, abdominal pain, blood in stool, constipation, diarrhea, nausea and vomiting.    Genitourinary: Negative for dysuria, flank pain, frequency and hematuria. Musculoskeletal: Negative for arthralgias, back pain, neck pain and neck stiffness. Skin: Negative for rash and wound. Neurological: Positive for light-headedness and headaches. Negative for syncope, speech difficulty, weakness and numbness. Hematological: Negative for adenopathy. All other systems reviewed and are negative. Physical Exam   Constitutional: She is oriented to person, place, and time. She appears well-developed and well-nourished. HENT:   Head: Normocephalic and atraumatic. Head is without raccoon's eyes and without Mart's sign. Nose: Nose normal.   Mouth/Throat: Uvula is midline, oropharynx is clear and moist and mucous membranes are normal.   Eyes: Pupils are equal, round, and reactive to light. Conjunctivae, EOM and lids are normal.   Neck: Trachea normal and normal range of motion. Neck supple. No JVD present. Cardiovascular: Normal rate and regular rhythm. Pulmonary/Chest: Effort normal and breath sounds normal. No respiratory distress. She has no wheezes. She has no rales. Abdominal: Soft. Bowel sounds are normal. There is no tenderness. There is no rebound, no guarding and no CVA tenderness. Musculoskeletal: She exhibits no edema. Neurological: She is alert and oriented to person, place, and time. She has normal strength. No cranial nerve deficit or sensory deficit. Coordination normal.   Skin: Skin is warm and dry. Nursing note and vitals reviewed. Procedures    MDM  Number of Diagnoses or Management Options  Dizziness:   Lightheadedness:   Nonintractable headache, unspecified chronicity pattern, unspecified headache type:   Diagnosis management comments: She arrived with complaint of dizziness which she describes as lightheadedness along with fatigue. Her cardiac work-up and CTA chest are unremarkable for acute changes.   Patient recently had CT head performed which was remarkable for sinusitis for which she has been taking doxycycline. She has no neurological deficit. She is admitted in stable condition for continued work-up, treatment, and monitoring of her conditions. EKG Interpretation. EKG: This EKG is signed and interpreted by me. Rate: 78  Rhythm: Sinus  Interpretation: non-specific EKG, 1st degree AV block, LAD, LAFB  Comparison: no previous EKG          --------------------------------------------- PAST HISTORY ---------------------------------------------  Past Medical History:  has a past medical history of Aortic regurgitation, Arthritis, Asthma, Depression, Diabetes mellitus ([address] Hypothyroid, and Vertigo. Past Surgical History:  has a past surgical history that includes Cataract removal (Left); Appendectomy; Cholecystectomy (1982); and Hysterectomy. Social History:  reports that she has never smoked. She has never used smokeless tobacco. She reports that she does not drink alcohol or use drugs. Family History: family history includes Diabetes in her mother. The patients home medications have been reviewed. Allergies: Celexa [citalopram hydrobromide]; Ciprofloxacin; Keflex [cephalexin];  Sulfa antibiotics; and Victoza [liraglutide]    -------------------------------------------------- RESULTS -------------------------------------------------    LABS:  Results for orders placed or performed during the hospital encounter of 06/26/19   Troponin   Result Value Ref Range    Troponin <0.01 0.00 - 0.03 ng/mL   Brain Natriuretic Peptide   Result Value Ref Range    Pro-BNP 79 0 - 450 pg/mL   CBC Auto Differential   Result Value Ref Range    WBC 9.7 4.5 - 11.5 E9/L    RBC 3.83 3.50 - 5.50 E12/L    Hemoglobin 12.2 11.5 - 15.5 g/dL    Hematocrit 35.8 34.0 - 48.0 %    MCV 93.5 80.0 - 99.9 fL    MCH 31.9 26.0 - 35.0 pg    MCHC 34.1 32.0 - 34.5 %    RDW 12.8 11.5 - 15.0 fL    Platelets 074 804 - 780 E9/L    MPV 12.2 (H) 7.0 - 12.0 fL    Neutrophils % 56.5 43.0 - 80.0 %    Immature Granulocytes % 0.2 0.0 - 5.0 %    Lymphocytes % 33.5 20.0 - 42.0 %    Monocytes % 7.5 2.0 - 12.0 %    Eosinophils % 1.4 0.0 - 6.0 %    Basophils % 0.9 0.0 - 2.0 %    Neutrophils # 5.48 1.80 - 7.30 E9/L    Immature Granulocytes # 0.02 E9/L    Lymphocytes # 3.25 1.50 - 4.00 E9/L    Monocytes # 0.73 0.10 - 0.95 E9/L    Eosinophils # 0.14 0.05 - 0.50 E9/L    Basophils # 0.09 0.00 - 0.20 E9/L   Comprehensive Metabolic Panel   Result Value Ref Range    Sodium 141 132 - 146 mmol/L    Potassium 4.2 3.5 - 5.0 mmol/L    Chloride 102 98 - 107 mmol/L    CO2 25 22 - 29 mmol/L    Anion Gap 14 7 - 16 mmol/L    Glucose 146 (H) 74 - 99 mg/dL    BUN 20 8 - 23 mg/dL    CREATININE 0.9 0.5 - 1.0 mg/dL    GFR Non-African American 59 >=60 mL/min/1.73    GFR African American >60     Calcium 9.4 8.6 - 10.2 mg/dL    Total Protein 7.8 6.4 - 8.3 g/dL    Alb 4.4 3.5 - 5.2 g/dL    Total Bilirubin 0.3 0.0 - 1.2 mg/dL    Alkaline Phosphatase 51 35 - 104 U/L    ALT 16 0 - 32 U/L    AST 26 0 - 31 U/L   POCT Glucose   Result Value Ref Range    Glucose 142 mg/dL    QC OK? ok    POCT Glucose   Result Value Ref Range    Meter Glucose 142 (H) 74 - 99 mg/dL   EKG 12 Lead   Result Value Ref Range    Ventricular Rate 78 BPM    Atrial Rate 78 BPM    P-R Interval 248 ms    QRS Duration 100 ms    Q-T Interval 392 ms    QTc Calculation (Bazett) 446 ms    P Axis 15 degrees    R Axis -42 degrees    T Axis 29 degrees       RADIOLOGY:  CTA CHEST W CONTRAST   Final Result   1. No acute pulmonary emboli. 2. No aneurysm formation dissection the thoracic aorta. 3. No acute infiltrates in the lung parenchyma or pleural effusions. .      XR CHEST PORTABLE   Final Result   1. Enlarged cardiomediastinal silhouette, the possibility of   underlying pericardial effusion or other cardiac abnormalities are not   excluded on the basis of this exam, clinical correlation recommended. .   2. Central pulmonary vascular congestion. 3. Vascular calcifications thoracic aorta people.     4. Nonspecific bibasilar airspace disease, findings can be seen in   infiltrate/pneumonia and/or atelectasis. Andre Kinseypete ------------------------- NURSING NOTES AND VITALS REVIEWED ---------------------------  Date / Time Roomed:  6/26/2019  2:56 PM  ED Bed Assignment:  26/26    The nursing notes within the ED encounter and vital signs as below have been reviewed. Patient Vitals for the past 24 hrs:   BP Temp Temp src Pulse Resp SpO2 Height Weight   06/26/19 1718 (!) 143/70 -- -- 79 16 96 % -- --   06/26/19 1534 (!) 129/57 -- -- 69 16 94 % -- --   06/26/19 1452 (!) 180/74 98.2 °F (36.8 °C) Oral 83 16 95 % 5' 1\" (1.549 m) 180 lb (81.6 kg)       Oxygen Saturation Interpretation: Normal    ------------------------------------------ PROGRESS NOTES ------------------------------------------  Re-evaluation(s):  5:10 PM  Patient is not in distress. She states that she feels about the same. Counseling:  I have spoken with the patient and discussed todays results, in addition to providing specific details for the plan of care and counseling regarding the diagnosis and prognosis. Their questions are answered at this time and they are agreeable with the plan of admission.    --------------------------------- ADDITIONAL PROVIDER NOTES ---------------------------------  Consultations:  Spoke with Dr. Jose J Caceres. Discussed case. They will admit the patient. This patient's ED course included: a personal history and physicial examination, multiple bedside re-evaluations, IV medications, cardiac monitoring and continuous pulse oximetry    This patient has remained hemodynamically stable during their ED course. Diagnosis:  1. Dizziness    2. Diabetes mellitus due to underlying condition with hyperosmolarity and coma, with long-term current use of insulin (HCC)    3. Vertigo    4. Lightheadedness    5.  Nonintractable headache, unspecified chronicity pattern, unspecified headache type        Disposition:  Patient's disposition: Admit to

## 2019-06-26 NOTE — PROGRESS NOTES
Lee Seen was ordered Metanx and Ultracet which are  nonformulary medication. The patient has indicated that the home supply of this medication will be brought in to the hospital for inpatient use. If the medication has not been administered by 1400 on the following day from the time the order was placed, a pharmacist will follow-up with the nurse of the patient to assess the capability of the patient to bring in the medication. If it is determined that the patient cannot supply the medication and it is not available to be dispensed from the pharmacy, a call will be placed to the ordering provider to discuss alternative options.

## 2019-06-27 ENCOUNTER — APPOINTMENT (OUTPATIENT)
Dept: MRI IMAGING | Age: 84
End: 2019-06-27
Payer: MEDICARE

## 2019-06-27 PROBLEM — N18.30 STAGE 3 CHRONIC KIDNEY DISEASE (HCC): Status: ACTIVE | Noted: 2019-06-27

## 2019-06-27 PROBLEM — F48.8 NERVOUS BREAKDOWN: Status: ACTIVE | Noted: 2019-06-27

## 2019-06-27 LAB
ALBUMIN SERPL-MCNC: 3.9 G/DL (ref 3.5–5.2)
ALP BLD-CCNC: 44 U/L (ref 35–104)
ALT SERPL-CCNC: 12 U/L (ref 0–32)
AMMONIA: 31.3 UMOL/L (ref 11–51)
ANION GAP SERPL CALCULATED.3IONS-SCNC: 11 MMOL/L (ref 7–16)
AST SERPL-CCNC: 20 U/L (ref 0–31)
BACTERIA: NORMAL /HPF
BASOPHILS ABSOLUTE: 0.06 E9/L (ref 0–0.2)
BASOPHILS RELATIVE PERCENT: 0.8 % (ref 0–2)
BILIRUB SERPL-MCNC: 0.3 MG/DL (ref 0–1.2)
BILIRUBIN DIRECT: <0.2 MG/DL (ref 0–0.3)
BILIRUBIN URINE: NEGATIVE
BILIRUBIN, INDIRECT: NORMAL MG/DL (ref 0–1)
BLOOD, URINE: NEGATIVE
BUN BLDV-MCNC: 23 MG/DL (ref 8–23)
C-REACTIVE PROTEIN: 0.9 MG/DL (ref 0–0.4)
CA 125: 4 U/ML (ref 0–35)
CALCIUM SERPL-MCNC: 9.1 MG/DL (ref 8.6–10.2)
CEA: 1 NG/ML (ref 0–5.2)
CHLORIDE BLD-SCNC: 105 MMOL/L (ref 98–107)
CHOLESTEROL, TOTAL: 210 MG/DL (ref 0–199)
CLARITY: CLEAR
CO2: 25 MMOL/L (ref 22–29)
COLOR: YELLOW
CREAT SERPL-MCNC: 0.9 MG/DL (ref 0.5–1)
EOSINOPHILS ABSOLUTE: 0.12 E9/L (ref 0.05–0.5)
EOSINOPHILS RELATIVE PERCENT: 1.6 % (ref 0–6)
EPITHELIAL CELLS, UA: NORMAL /HPF
FILM ARRAY ADENOVIRUS: NORMAL
FILM ARRAY BORDETELLA PERTUSSIS: NORMAL
FILM ARRAY CHLAMYDOPHILIA PNEUMONIAE: NORMAL
FILM ARRAY CORONAVIRUS 229E: NORMAL
FILM ARRAY CORONAVIRUS HKU1: NORMAL
FILM ARRAY CORONAVIRUS NL63: NORMAL
FILM ARRAY CORONAVIRUS OC43: NORMAL
FILM ARRAY INFLUENZA A VIRUS 09H1: NORMAL
FILM ARRAY INFLUENZA A VIRUS H1: NORMAL
FILM ARRAY INFLUENZA A VIRUS H3: NORMAL
FILM ARRAY INFLUENZA A VIRUS: NORMAL
FILM ARRAY INFLUENZA B: NORMAL
FILM ARRAY METAPNEUMOVIRUS: NORMAL
FILM ARRAY MYCOPLASMA PNEUMONIAE: NORMAL
FILM ARRAY PARAINFLUENZA VIRUS 1: NORMAL
FILM ARRAY PARAINFLUENZA VIRUS 2: NORMAL
FILM ARRAY PARAINFLUENZA VIRUS 3: NORMAL
FILM ARRAY PARAINFLUENZA VIRUS 4: NORMAL
FILM ARRAY RESPIRATORY SYNCITIAL VIRUS: NORMAL
FILM ARRAY RHINOVIRUS/ENTEROVIRUS: NORMAL
FOLATE: >20 NG/ML (ref 4.8–24.2)
GFR AFRICAN AMERICAN: >60
GFR NON-AFRICAN AMERICAN: 59 ML/MIN/1.73
GLUCOSE BLD-MCNC: 136 MG/DL (ref 74–99)
GLUCOSE URINE: NEGATIVE MG/DL
HBA1C MFR BLD: 5.8 % (ref 4–5.6)
HCT VFR BLD CALC: 31.8 % (ref 34–48)
HDLC SERPL-MCNC: 47 MG/DL
HEMOGLOBIN: 11.1 G/DL (ref 11.5–15.5)
IMMATURE GRANULOCYTES #: 0.01 E9/L
IMMATURE GRANULOCYTES %: 0.1 % (ref 0–5)
KETONES, URINE: NEGATIVE MG/DL
LDL CHOLESTEROL CALCULATED: 135 MG/DL (ref 0–99)
LEUKOCYTE ESTERASE, URINE: NEGATIVE
LYMPHOCYTES ABSOLUTE: 3.06 E9/L (ref 1.5–4)
LYMPHOCYTES RELATIVE PERCENT: 42 % (ref 20–42)
MAGNESIUM: 2 MG/DL (ref 1.6–2.6)
MCH RBC QN AUTO: 32.7 PG (ref 26–35)
MCHC RBC AUTO-ENTMCNC: 34.9 % (ref 32–34.5)
MCV RBC AUTO: 93.8 FL (ref 80–99.9)
METER GLUCOSE: 125 MG/DL (ref 74–99)
METER GLUCOSE: 136 MG/DL (ref 74–99)
METER GLUCOSE: 96 MG/DL (ref 74–99)
MONOCYTES ABSOLUTE: 0.54 E9/L (ref 0.1–0.95)
MONOCYTES RELATIVE PERCENT: 7.4 % (ref 2–12)
NEUTROPHILS ABSOLUTE: 3.5 E9/L (ref 1.8–7.3)
NEUTROPHILS RELATIVE PERCENT: 48.1 % (ref 43–80)
NITRITE, URINE: NEGATIVE
OSMOLALITY URINE: 593 MOSM/KG (ref 300–900)
OSMOLALITY: 298 MOSM/KG (ref 285–310)
PDW BLD-RTO: 12.8 FL (ref 11.5–15)
PH UA: 5 (ref 5–9)
PHOSPHORUS: 3.3 MG/DL (ref 2.5–4.5)
PLATELET # BLD: 146 E9/L (ref 130–450)
PMV BLD AUTO: 12.2 FL (ref 7–12)
POTASSIUM SERPL-SCNC: 4.1 MMOL/L (ref 3.5–5)
PRO-BNP: 68 PG/ML (ref 0–450)
PROCALCITONIN: 0.16 NG/ML (ref 0–0.08)
PROTEIN UA: NEGATIVE MG/DL
RBC # BLD: 3.39 E12/L (ref 3.5–5.5)
RBC UA: NORMAL /HPF (ref 0–2)
SODIUM BLD-SCNC: 141 MMOL/L (ref 132–146)
SPECIFIC GRAVITY UA: 1.01 (ref 1–1.03)
TOTAL PROTEIN: 6.9 G/DL (ref 6.4–8.3)
TRIGL SERPL-MCNC: 142 MG/DL (ref 0–149)
TSH SERPL DL<=0.05 MIU/L-ACNC: 2.43 UIU/ML (ref 0.27–4.2)
URIC ACID, SERUM: 7.3 MG/DL (ref 2.4–5.7)
UROBILINOGEN, URINE: 0.2 E.U./DL
VITAMIN B-12: 900 PG/ML (ref 211–946)
VLDLC SERPL CALC-MCNC: 28 MG/DL
WBC # BLD: 7.3 E9/L (ref 4.5–11.5)
WBC UA: NORMAL /HPF (ref 0–5)

## 2019-06-27 PROCEDURE — 83735 ASSAY OF MAGNESIUM: CPT

## 2019-06-27 PROCEDURE — 96372 THER/PROPH/DIAG INJ SC/IM: CPT

## 2019-06-27 PROCEDURE — 97161 PT EVAL LOW COMPLEX 20 MIN: CPT

## 2019-06-27 PROCEDURE — 85025 COMPLETE CBC W/AUTO DIFF WBC: CPT

## 2019-06-27 PROCEDURE — 80061 LIPID PANEL: CPT

## 2019-06-27 PROCEDURE — 84100 ASSAY OF PHOSPHORUS: CPT

## 2019-06-27 PROCEDURE — 80076 HEPATIC FUNCTION PANEL: CPT

## 2019-06-27 PROCEDURE — 82962 GLUCOSE BLOOD TEST: CPT

## 2019-06-27 PROCEDURE — 83880 ASSAY OF NATRIURETIC PEPTIDE: CPT

## 2019-06-27 PROCEDURE — 36415 COLL VENOUS BLD VENIPUNCTURE: CPT

## 2019-06-27 PROCEDURE — 84550 ASSAY OF BLOOD/URIC ACID: CPT

## 2019-06-27 PROCEDURE — G0378 HOSPITAL OBSERVATION PER HR: HCPCS

## 2019-06-27 PROCEDURE — 6360000002 HC RX W HCPCS: Performed by: INTERNAL MEDICINE

## 2019-06-27 PROCEDURE — 97165 OT EVAL LOW COMPLEX 30 MIN: CPT

## 2019-06-27 PROCEDURE — 70551 MRI BRAIN STEM W/O DYE: CPT

## 2019-06-27 PROCEDURE — 6370000000 HC RX 637 (ALT 250 FOR IP): Performed by: INTERNAL MEDICINE

## 2019-06-27 PROCEDURE — 84443 ASSAY THYROID STIM HORMONE: CPT

## 2019-06-27 PROCEDURE — 96375 TX/PRO/DX INJ NEW DRUG ADDON: CPT

## 2019-06-27 PROCEDURE — 83930 ASSAY OF BLOOD OSMOLALITY: CPT

## 2019-06-27 PROCEDURE — 82140 ASSAY OF AMMONIA: CPT

## 2019-06-27 PROCEDURE — 99222 1ST HOSP IP/OBS MODERATE 55: CPT | Performed by: PSYCHIATRY & NEUROLOGY

## 2019-06-27 PROCEDURE — 80048 BASIC METABOLIC PNL TOTAL CA: CPT

## 2019-06-27 RX ORDER — AMLODIPINE BESYLATE 2.5 MG/1
2.5 TABLET ORAL DAILY
Status: DISCONTINUED | OUTPATIENT
Start: 2019-06-27 | End: 2019-06-28 | Stop reason: HOSPADM

## 2019-06-27 RX ORDER — SODIUM CHLORIDE 0.9 % (FLUSH) 0.9 %
SYRINGE (ML) INJECTION
Status: DISPENSED
Start: 2019-06-27 | End: 2019-06-28

## 2019-06-27 RX ORDER — LORAZEPAM 0.5 MG/1
0.5 TABLET ORAL 2 TIMES DAILY PRN
Status: DISCONTINUED | OUTPATIENT
Start: 2019-06-27 | End: 2019-06-28 | Stop reason: HOSPADM

## 2019-06-27 RX ORDER — MIRTAZAPINE 15 MG/1
15 TABLET, FILM COATED ORAL NIGHTLY
Status: DISCONTINUED | OUTPATIENT
Start: 2019-06-27 | End: 2019-06-28 | Stop reason: HOSPADM

## 2019-06-27 RX ORDER — LORAZEPAM 0.5 MG/1
0.5 TABLET ORAL 2 TIMES DAILY PRN
Qty: 28 TABLET | Refills: 0 | Status: SHIPPED | OUTPATIENT
Start: 2019-06-27 | End: 2019-07-11

## 2019-06-27 RX ORDER — DOXYCYCLINE HYCLATE 100 MG/1
100 CAPSULE ORAL 2 TIMES DAILY
Status: DISCONTINUED | OUTPATIENT
Start: 2019-06-27 | End: 2019-06-28 | Stop reason: HOSPADM

## 2019-06-27 RX ORDER — MIRTAZAPINE 15 MG/1
15 TABLET, FILM COATED ORAL NIGHTLY
Qty: 30 TABLET | Refills: 0 | Status: SHIPPED | OUTPATIENT
Start: 2019-06-27

## 2019-06-27 RX ADMIN — TRAMADOL HYDROCHLORIDE 25 MG: 50 TABLET, FILM COATED ORAL at 09:13

## 2019-06-27 RX ADMIN — BUSPIRONE HYDROCHLORIDE 10 MG: 10 TABLET ORAL at 14:36

## 2019-06-27 RX ADMIN — AMLODIPINE BESYLATE 2.5 MG: 2.5 TABLET ORAL at 12:13

## 2019-06-27 RX ADMIN — ACETAMINOPHEN 162.5 MG: 325 TABLET ORAL at 14:36

## 2019-06-27 RX ADMIN — ACETAMINOPHEN 162.5 MG: 325 TABLET ORAL at 09:12

## 2019-06-27 RX ADMIN — GLIPIZIDE 5 MG: 5 TABLET ORAL at 06:36

## 2019-06-27 RX ADMIN — GLIPIZIDE 5 MG: 5 TABLET ORAL at 16:07

## 2019-06-27 RX ADMIN — FAMOTIDINE 20 MG: 20 TABLET, FILM COATED ORAL at 09:12

## 2019-06-27 RX ADMIN — BUMETANIDE 0.5 MG: 1 TABLET ORAL at 09:12

## 2019-06-27 RX ADMIN — BUSPIRONE HYDROCHLORIDE 10 MG: 10 TABLET ORAL at 09:18

## 2019-06-27 RX ADMIN — CETIRIZINE HYDROCHLORIDE 5 MG: 10 TABLET, FILM COATED ORAL at 09:13

## 2019-06-27 RX ADMIN — LORAZEPAM 0.5 MG: 2 INJECTION INTRAMUSCULAR; INTRAVENOUS at 09:48

## 2019-06-27 RX ADMIN — SUCRALFATE 1 G: 1 TABLET ORAL at 09:13

## 2019-06-27 RX ADMIN — HYDROXYZINE HYDROCHLORIDE 10 MG: 10 TABLET ORAL at 09:12

## 2019-06-27 RX ADMIN — DOXYCYCLINE HYCLATE 100 MG: 100 CAPSULE ORAL at 12:13

## 2019-06-27 RX ADMIN — TRAMADOL HYDROCHLORIDE 25 MG: 50 TABLET, FILM COATED ORAL at 14:36

## 2019-06-27 RX ADMIN — ESCITALOPRAM OXALATE 20 MG: 10 TABLET ORAL at 09:12

## 2019-06-27 RX ADMIN — ENOXAPARIN SODIUM 40 MG: 40 INJECTION SUBCUTANEOUS at 09:13

## 2019-06-27 ASSESSMENT — PAIN SCALES - GENERAL
PAINLEVEL_OUTOF10: 2
PAINLEVEL_OUTOF10: 0

## 2019-06-27 NOTE — PLAN OF CARE
Problem: Pain:  Goal: Pain level will decrease  Description  Pain level will decrease  6/27/2019 0120 by Antelmo Kay RN  Outcome: Met This Shift

## 2019-06-27 NOTE — CARE COORDINATION
Social work / discharge planning    6/27/2019 2:25 PM     Met with patient who states she lives alone,  Has children in area that assist and transport her. Patient reports being independent pta with adl's and enjoys cooking, baking and cleaning. AM PAC 17/24. Discussed rehab vs HHC. Patient states she is not going to rehab. She is from [place] and will not go to a facility. Offered hhc and patient declined.      SW to follow    Electronically signed by ANAHY Dumont on 6/27/2019 at 2:28 PM

## 2019-06-27 NOTE — PLAN OF CARE
Problem: Falls - Risk of:  Goal: Will remain free from falls  Description  Will remain free from falls  Outcome: Met This Shift     Problem: Pain:  Goal: Pain level will decrease  Description  Pain level will decrease  6/27/2019 0120 by Marylene Jabs, RN  Outcome: Met This Shift

## 2019-06-27 NOTE — PROGRESS NOTES
Physical Therapy    Facility/Department: [address] INTERMEDIATE  Initial Assessment    NAME: Matt Black  : 1933  MRN: [id number]    Date of Service: 2019       REQUIRES PT FOLLOW UP: Yes       Patient Diagnosis(es): The primary encounter diagnosis was Dizziness. Diagnoses of Diabetes mellitus due to underlying condition with hyperosmolarity and coma, with long-term current use of insulin ([address] Vertigo, Lightheadedness, and Nonintractable headache, unspecified chronicity pattern, unspecified headache type were also pertinent to this visit. has a past medical history of Aortic regurgitation, Arthritis, Asthma, Depression, Diabetes mellitus ([address] Hypothyroid, and Vertigo. has a past surgical history that includes Cataract removal (Left); Appendectomy; Cholecystectomy (); and Hysterectomy. Evaluating Therapist: Noble Hdz PT        Room #:  822   DIAGNOSIS: vertigo  PRECAUTIONS: falls     Social:  Pt lives alone  in a  1  floor plan    Prior to admission pt walked with  No AD, independent . Initial Evaluation  Date: 19 Treatment      Short Term/ Long Term   Goals   Was pt agreeable to Eval/treatment? yes      Does pt have pain?  c/o dizziness      Bed Mobility  Rolling:  NT  Supine to sit: SBA   Sit to supine:  NT   Scooting:  SBA    independent    Transfers Sit to stand:  CGA   Stand to sit:  CGA   Stand pivot:  CGA   Independent    Ambulation     4  feet with  No Ad  with  CGA    150 feet with  No AD  Or AAD independent        Stair negotiation: ascended and descended NT    4  steps with  1 rail with independent    LE ROM  WFL      LE strength  4/5      AM- PAC RAW score   17/ 24            Pt is alert and Oriented . Pt distractible throughout eval. Anxious with mobility. Not fully answering questions     Balance: CGA   Endurance: limited by dizziness   Chair alarm: Yes      ASSESSMENT  Pt displays functional ability as noted in the objective portion of this evaluation.

## 2019-06-27 NOTE — PROGRESS NOTES
P Quality Flow/Interdisciplinary Rounds Progress Note        Quality Flow Rounds held on June 27, 2019    Disciplines Attending:  Bedside Nurse, ,  and Nursing Unit Leadership    Chip Ballesteros was admitted on 6/26/2019  2:56 PM    Anticipated Discharge Date:  Expected Discharge Date: 06/29/19    Disposition:    Jose Angel Score:  Jose Angel Scale Score: 20    Readmission Risk              Risk of Unplanned Readmission:        11           Discussed patient goal for the day, patient clinical progression, and barriers to discharge. The following Goal(s) of the Day/Commitment(s) have been identified:  MORRIS today.       Michael Bhandari  June 27, 2019

## 2019-06-27 NOTE — CONSULTS
PSYCHIATRY ATTENDING CONSULT    REASON FOR CONSULT:  Possible confusion and/or depression    REQUESTING PHYSICIAN:  Dr. Celia Isbell: \"I keep getting dizzy. \"    HISTORY OF PRESENT ILLNESS:  Raymon Marc is an 80 y.o. female with history of anxiety/depression who was admitted on 6/26/19 due to dizziness. Chart reviewed. Note patient on Lexapro 20 mg daily, Buspar 10 mg tid and Atarax 10 mg daily. Currently patient is alert and oriented. She provides fair history and daughter is also at bedside to corroborate history. Patient apparently had some fluid in her ear back in April and since then has been having these episodes of dizziness. Patient reports it comes on suddenly and she also starts shaking and feeling like something bad is about to happen to her. Denies shortness of breath or diaphoresis during the episodes. During the same time period she also acknowledges some depression with symptoms of dysphoria, anhedonia, irritability, poor sleep, increased isolation and tearfulness. Denies suicidal ideations. No homicidal ideations. No tino or psychosis. Patient reports she was starting to have another episode earlier prior to MRI and received Ativan which really calmed her. PAST PSYCHIATRIC HISTORY:  No psychiatric admissions in the past. Reports a \"nervous breakdown\" years ago and was on Valium for a time.  No current psychiatrist or therapist.    PAST MEDICAL HISTORY:       Diagnosis Date    Allergic rhinitis     Most of her life until recently; under care of allergist    Aortic regurgitation 2017    Arthritis     Asthma     Depression 2007    Diabetes mellitus ([address] 1978    Diabetic peripheral neuropathy ([address]     Hypothyroid 1980    Nervous breakdown 1954    Treated by psychiatry, Valium much improvement    Sphenoid sinusitis     By CT 6/2019    Stage 3 chronic kidney disease ([address] 6/27/2019    Vertigo      MEDICAL ROS: General - negative, neurological - negative, ENT - negative, **AND** acetaminophen (TYLENOL) tablet 162.5 mg, 162.5 mg, Oral, TID    ALLERGIES:  Celexa [citalopram hydrobromide]; Ciprofloxacin; Keflex [cephalexin]; Sulfa antibiotics; and Victoza [liraglutide]    FAMILY PSYCHIATRIC HISTORY: Noncontributory. SOCIAL HISTORY: Patient is . She lives alone in a house in [place] but daughter very involved. Patient also has a son in [facility]. SUBSTANCE ABUSE HISTORY:  reports that she has never smoked. She has never used smokeless tobacco. She reports that she does not drink alcohol or use drugs. VITALS:   Vitals:    06/27/19 1600   BP: (!) 149/67   Pulse: 59   Resp: 15   Temp: 98.7 °F (37.1 °C)   SpO2: 96%     IMAGING: probable sphenoid sinusitis on head CT; brain MRI also reviewed - atrophy and chronic microvascular ischemic changes    MENTAL STATUS EXAMINATION  White female appears age. Pleasant, cooperative, forthcoming. Normal psychomotor activity, strength, tone, eye contact. Gait not assessed. Mood anxious and dysphoric. Affect flexible. Speech clear. Thought process generally organized. Content future-oriented. Some somatic preoccupation. No suicidal or homicidal ideations. No paranoia, overt delusions, hallucinations. Orientation, concentration, recent and remote memory are grossly intact. Fund of knowledge fair. Language use fair. Insight and judgment fair. ASSESSMENT:  Panic Disorder     MDD recurrent moderate       PLAN & RECOMMENDATIONS: Suspect most of recent symptomatology is psychiatrically based. Discussed case in detail with daughter at bedside. Will start Remeron at bedtime for depression, sleep and anxiety with low-dose prn Ativan available during daytime. No indication for psychiatric admission. OK to discharge from my standpoint. Scripts in chart. Follow-up with primary care and psychiatry as needed.      Samreen Young M.D. 6/27/2019 5:43 PM

## 2019-06-27 NOTE — H&P
Hypothyroid [address] breakdown 1954    Treated by psychiatry, Valium much improvement    Sphenoid sinusitis     By CT 6/2019    Stage 3 chronic kidney disease ([facility] 6/27/2019    Vertigo          Past Surgical History:   Procedure Laterality Date    APPENDECTOMY      CATARACT REMOVAL Left     CHOLECYSTECTOMY  1982    LAP SONNY    HYSTERECTOMY         Medications Prior to Admission:    Medications Prior to Admission: albuterol (ACCUNEB) 0.63 MG/3ML nebulizer solution, Take 1 ampule by nebulization every 12 hours as needed for Wheezing  busPIRone (BUSPAR) 10 MG tablet, Take 10 mg by mouth 3 times daily  bumetanide (BUMEX) 0.5 MG tablet, Take 0.5 mg by mouth daily  loratadine (CLARITIN) 10 MG capsule, Take 10 mg by mouth daily  escitalopram (LEXAPRO) 20 MG tablet, Take 20 mg by mouth daily  gabapentin (NEURONTIN) 100 MG capsule, Take 100 mg by mouth 3 times daily.   glipiZIDE (GLUCOTROL) 5 MG tablet, Take 5 mg by mouth 2 times daily (before meals)  hydrOXYzine (ATARAX) 10 MG tablet, Take 10 mg by mouth daily  L-Methylfolate-Algae-B12-B6 (METANX PO), Take 1 tablet by mouth 2 times daily  L-methylfolate-B6-B12 (METANX) 7-11.885-0-16 MG CAPS capsule, Take 1 capsule by mouth daily  famotidine (PEPCID) 20 MG tablet, Take 20 mg by mouth 2 times daily  sucralfate (CARAFATE) 1 GM tablet, Take 1 g by mouth 2 times daily  traMADol-Acetaminophen (ULTRACET PO), Take 37.5 mg by mouth 3 times daily Indications: half a tablet 3 times a day  albuterol sulfate HFA (VENTOLIN HFA) 108 (90 Base) MCG/ACT inhaler, Inhale 2 puffs into the lungs daily as needed for Wheezing  Ergocalciferol (VITAMIN D2 PO), Take 50,000 Units by mouth  doxycycline hyclate (VIBRA-TABS) 100 MG tablet, Take 1 tablet by mouth 2 times daily for 10 days (Pharmacist: May substitute monohydrate form prn)  ketorolac (TORADOL) 10 MG tablet, Take 1 tablet by mouth every 6 hours as needed for Pain    Allergies:    Celexa [citalopram hydrobromide]; Ciprofloxacin; Keflex [cephalexin]; Sulfa antibiotics; and Victoza [liraglutide]    Social History:    reports that she has never smoked. She has never used smokeless tobacco. She reports that she does not drink alcohol or use drugs. Family History:   family history includes Diabetes in her mother. REVIEW OF SYSTEMS:  As above in the HPI, otherwise negative    PHYSICAL EXAM:    VS: BP (!) 189/79   Pulse 61   Temp 98.1 °F (36.7 °C) (Oral)   Resp 18   Ht 5' 1\" (1.549 m)   Wt 177 lb 1.6 oz (80.3 kg)   SpO2 95%   BMI 33.46 kg/m²     General appearance: Alert, Awake, Oriented times 3, no distress  Skin: Warm and dry ; no rashes  Head: Normocephalic. No masses, lesions or tenderness noted  Eyes: Conjunctivae pink, sclera white. PERRL,EOM-I  Ears: External ears normal  Nose/Sinuses: Nares normal. Septum midline. Mucosa normal. No drainage  Oropharynx: Oropharynx clear with no exudate seen  Neck: Supple. No jugular venous distension, lymphadenopathy or thyromegaly Trachea midline  Lungs: Clear to auscultation bilaterally. No rhonchi, crackles or wheezes  Heart: S1 S2  Regular rate and rhythm. No rub or gallop; grade 1/6 diastolic murmur second right intercostal space  Abdomen: Soft, non-tender. BS normal. No masses, organomegaly; no rebound or guarding  Extremities: Trace edema, Peripheral pulses palpable  Musculoskeletal: Muscular strength appears intact. Neuro:  No focal motor defects ; II-XII grossly intact .  BEATTY equally  Breast: deferred  Rectal: deferred  Genitalia:  deferred    LABS:  CBC:   Lab Results   Component Value Date    WBC 7.3 06/27/2019    RBC 3.39 06/27/2019    HGB 11.1 06/27/2019    HCT 31.8 06/27/2019    MCV 93.8 06/27/2019    MCH 32.7 06/27/2019    MCHC 34.9 06/27/2019    RDW 12.8 06/27/2019     06/27/2019    MPV 12.2 06/27/2019     CBC with Differential:    Lab Results   Component Value Date    WBC 7.3 06/27/2019    RBC 3.39 06/27/2019    HGB 11.1 06/27/2019    HCT 31.8 TROPONINI <0.01 06/26/2019    TROPONINI <0.01 06/22/2019     U/A:    Lab Results   Component Value Date    COLORU Yellow 06/26/2019    PROTEINU Negative 06/26/2019    PHUR 5.0 06/26/2019    WBCUA 1-3 06/26/2019    RBCUA NONE 06/26/2019    BACTERIA NONE 06/26/2019    CLARITYU Clear 06/26/2019    SPECGRAV 1.010 06/26/2019    LEUKOCYTESUR Negative 06/26/2019    UROBILINOGEN 0.2 06/26/2019    BILIRUBINUR Negative 06/26/2019    BLOODU Negative 06/26/2019    GLUCOSEU Negative 06/26/2019     HgBA1c:    Lab Results   Component Value Date    LABA1C 5.8 06/26/2019     FLP:    Lab Results   Component Value Date    TRIG 142 06/27/2019    HDL 47 06/27/2019    LDLCALC 135 06/27/2019    LABVLDL 28 06/27/2019     TSH:    Lab Results   Component Value Date    TSH 2.430 06/27/2019     VITAMIN B12: No components found for: B12  FOLATE:    Lab Results   Component Value Date    FOLATE >20.0 06/26/2019     IRON:  No results found for: IRON  LIPASE:  No results found for: LIPASE    RADIOLOGY:  MRI Brain WO Contrast   Final Result   Moderate atrophy and mild chronic microvascular ischemic   disease. No evidence of recent infarct. Nothing else active. US CAROTID ARTERY BILATERAL   Final Result      No evidence for hemodynamically significant stenosis of the carotid   arteries based on NASCET criteria (0-49%)      No evidence for subclavian steal.      CTA CHEST W CONTRAST   Final Result   1. No acute pulmonary emboli. 2. No aneurysm formation dissection the thoracic aorta. 3. No acute infiltrates in the lung parenchyma or pleural effusions. .      XR CHEST PORTABLE   Final Result   1. Enlarged cardiomediastinal silhouette, the possibility of   underlying pericardial effusion or other cardiac abnormalities are not   excluded on the basis of this exam, clinical correlation recommended. .   2. Central pulmonary vascular congestion. 3. Vascular calcifications thoracic aorta people.     4. Nonspecific bibasilar airspace disease, findings can be seen in   infiltrate/pneumonia and/or atelectasis. .          ASSESSMENT:      Active Hospital Problems    Diagnosis    Stage 3 chronic kidney disease ([facility] [N18.3]    Nervous breakdown [F48.8]    Asthma [J45.909]    Allergic rhinitis [J30.9]    Sphenoid sinusitis [J32.3]    Diabetic peripheral neuropathy (HCC) [E11.42]    Vertigo [R42]    Aortic regurgitation [I35.1]    Depression [F32.9]    Hypothyroid [E03.9]    Diabetes mellitus ([facility] [E11.9]       PLAN:  Medications discussed with patient  GI prophylaxis  DVT prophylaxis  Begin Norvasc 2.5 mg daily, likely hypertension  Orthostatic blood pressure checks have been minimally abnormal  Psychiatric consult, patient agrees  Brayan-Synephrine nasal spray   Saline nasal spray  Restart oral doxycycline  MRI of brain pending  Carotid ultrasounds negative including no evidence of subclavian steal  Continue Zyrtec  Sliding scale insulin  Gabapentin has been stopped temporarily, possible cause for the vertigo  Follow labs          Please note that over 50 minutes was spent in evaluating the patient, review of records and results, discussion with staff/family, etc.    [address]  11:53 AM  6/27/2019    Voice recognition software use for dictation

## 2019-06-27 NOTE — PROGRESS NOTES
Occupational Therapy  OCCUPATIONAL THERAPY INITIAL EVALUATION      Date:2019  Patient Name: Matt Black  MRN: [id number]  : 1933  Room: [address] [place] [address]    Evaluating OT: Louann Cat OTR/L [id number]    AM-PAC Daily Activity Raw Score:     Recommended Adaptive Equipment: TBD    Diagnosis: Vertigo. Pt presents to ED with dizziness. Pertinent Medical History: DM, OA   Precautions:  falls     Home Living: Pt lives alone in a single story home. Bathroom setup: walk in shower      Prior Level of Function: Independent with ADLs, Independent with IADLs; completed functional mobility SPC in the last few weeks due to dizziness. Pain Level: no pain just dizziness occasionally. No movements make it better or worse    Cognition: A&O: /. Pt is alert and oriented but easily distracted during session   Problem solving:  fair   Judgement/safety:  fair     Functional Assessment:   Initial Eval Status  Date: 19 Treatment session:  Short Term Goals  Treatment frequency: 2-5x/wk PRN x1-3 wks   Feeding Set up     Grooming Set up  Independent   UB Dressing Set up  Independent   LB [address] by dizziness with forward trunk flexion  Mod I    Bathing Min A  Mod I   Toileting Min A  Mod I   Bed Mobility  Supine to sit: SBA     Functional Transfers STS: SBA  Mod I   Functional Mobility CGA with no AD  Small steps over to the chair  Mod I during ADLs   Balance Sitting: fair    Standing: fair no AD     Activity Tolerance fair  standing mignon x6-7 min with fair plus balance during self care tasks           ADL comments: Pt is limited in completion of self care tasks due to appearance of anxiety, and dizziness brought on occasionally during the session. Pt could not identify any aggravating factors.      Strength ROM Additional Info:    RUE  4-/5 WFL good  and FMC/dexterity noted during ADL tasks     LUE 4-/5 WFL good  and FMC/dexterity noted during ADL tasks         Hearing: [facility]   Vision: WFL   Sensation:  No c/o numbness or tingling   Tone: WFL   Edema: none                             Comments/Treatment: Patient educated on adapted techniques for completion of ADL, safe functional transfers and mobility. Patient required cues for follow through with proper hand/foot placement, pacing, safety, and technique in bed mobility, functional transfers, functional mobility and LB Dressing in preparation for maximum independence in all self care tasks. Eval Complexity: Low    Assessment of current deficits   Functional mobility [x]  ADLs [x] Strength [x]  Cognition []  Functional transfers  [x] IADLs [x] Safety Awareness [x]  Endurance [x]  Fine Motor Coordination [] Balance [x] Vision/perception [] Sensation []   Gross Motor Coordination [] ROM [] Delirium []                  Motor Control []    Plan of Care:   ADL retraining [x]   Equipment needs [x]   Neuromuscular re-education [x] Energy Conservation Techniques [x]  Functional Transfer training [x] Patient and/or Family Education [x]  Functional Mobility training [x]  Environmental Modifications [x]  Cognitive re-training []   Compensatory techniques for ADLs [x]  Splinting Needs []   Positioning to improve overall function [x]   Therapeutic Activity [x]  Therapeutic Exercise  [x]  Visual/Perceptual: []    Delirium prevention/treatment  []   Other:  []    Rehab Potential: Good for established goals     Patient / Family Goal: To get home. Patient and/or family were instructed on functional diagnosis, prognosis/goals and OT plan of care. Pt verbalized fair understanding. Upon arrival, patient supine in bed. At end of session, patient seated in chair with call light and phone within reach, all lines and tubes intact. Pt would benefit from continued skilled OT to increase safety and independence with completion of ADL/IADL tasks for functional independence and quality of life.  Bed/chair alarm: ON    Low Evaluation + 0 timed treatment minutes    Evaluation time includes thorough review of current medical information, gathering information on past medical history/social history and prior level of function, completion of standardized testing/informal observation of tasks, assessment of data, and development of POC/Goals    Jhonathan Morris OTR/USHA  [id number]

## 2019-06-28 VITALS
SYSTOLIC BLOOD PRESSURE: 159 MMHG | BODY MASS INDEX: 33.99 KG/M2 | HEART RATE: 62 BPM | WEIGHT: 180 LBS | OXYGEN SATURATION: 93 % | DIASTOLIC BLOOD PRESSURE: 66 MMHG | HEIGHT: 61 IN | TEMPERATURE: 97.9 F | RESPIRATION RATE: 16 BRPM

## 2019-06-28 LAB
ALBUMIN SERPL-MCNC: 3.7 G/DL (ref 3.5–5.2)
ALP BLD-CCNC: 39 U/L (ref 35–104)
ALT SERPL-CCNC: 11 U/L (ref 0–32)
ANION GAP SERPL CALCULATED.3IONS-SCNC: 9 MMOL/L (ref 7–16)
AST SERPL-CCNC: 17 U/L (ref 0–31)
BASOPHILS ABSOLUTE: 0.05 E9/L (ref 0–0.2)
BASOPHILS RELATIVE PERCENT: 0.7 % (ref 0–2)
BILIRUB SERPL-MCNC: 0.3 MG/DL (ref 0–1.2)
BILIRUBIN DIRECT: <0.2 MG/DL (ref 0–0.3)
BILIRUBIN, INDIRECT: NORMAL MG/DL (ref 0–1)
BUN BLDV-MCNC: 14 MG/DL (ref 8–23)
CA 19-9: 15 U/ML (ref 0–37)
CALCIUM SERPL-MCNC: 8.2 MG/DL (ref 8.6–10.2)
CHLORIDE BLD-SCNC: 109 MMOL/L (ref 98–107)
CO2: 26 MMOL/L (ref 22–29)
CREAT SERPL-MCNC: 0.8 MG/DL (ref 0.5–1)
EOSINOPHILS ABSOLUTE: 0.12 E9/L (ref 0.05–0.5)
EOSINOPHILS RELATIVE PERCENT: 1.7 % (ref 0–6)
GFR AFRICAN AMERICAN: >60
GFR NON-AFRICAN AMERICAN: >60 ML/MIN/1.73
GLUCOSE BLD-MCNC: 110 MG/DL (ref 74–99)
HCT VFR BLD CALC: 30.7 % (ref 34–48)
HCT VFR BLD CALC: 32.6 % (ref 34–48)
HEMOGLOBIN: 10.4 G/DL (ref 11.5–15.5)
HEMOGLOBIN: 11.2 G/DL (ref 11.5–15.5)
IMMATURE GRANULOCYTES #: 0.01 E9/L
IMMATURE GRANULOCYTES %: 0.1 % (ref 0–5)
LYMPHOCYTES ABSOLUTE: 3.39 E9/L (ref 1.5–4)
LYMPHOCYTES RELATIVE PERCENT: 46.7 % (ref 20–42)
MAGNESIUM: 1.9 MG/DL (ref 1.6–2.6)
MCH RBC QN AUTO: 31.8 PG (ref 26–35)
MCHC RBC AUTO-ENTMCNC: 33.9 % (ref 32–34.5)
MCV RBC AUTO: 93.9 FL (ref 80–99.9)
METER GLUCOSE: 102 MG/DL (ref 74–99)
METER GLUCOSE: 107 MG/DL (ref 74–99)
METER GLUCOSE: 111 MG/DL (ref 74–99)
METER GLUCOSE: 138 MG/DL (ref 74–99)
MONOCYTES ABSOLUTE: 0.5 E9/L (ref 0.1–0.95)
MONOCYTES RELATIVE PERCENT: 6.9 % (ref 2–12)
NEUTROPHILS ABSOLUTE: 3.19 E9/L (ref 1.8–7.3)
NEUTROPHILS RELATIVE PERCENT: 43.9 % (ref 43–80)
PDW BLD-RTO: 13 FL (ref 11.5–15)
PHOSPHORUS: 3.1 MG/DL (ref 2.5–4.5)
PLATELET # BLD: 150 E9/L (ref 130–450)
PMV BLD AUTO: 12.1 FL (ref 7–12)
POTASSIUM SERPL-SCNC: 4 MMOL/L (ref 3.5–5)
RBC # BLD: 3.27 E12/L (ref 3.5–5.5)
SODIUM BLD-SCNC: 144 MMOL/L (ref 132–146)
TOTAL PROTEIN: 6.4 G/DL (ref 6.4–8.3)
WBC # BLD: 7.3 E9/L (ref 4.5–11.5)

## 2019-06-28 PROCEDURE — 36415 COLL VENOUS BLD VENIPUNCTURE: CPT

## 2019-06-28 PROCEDURE — 96375 TX/PRO/DX INJ NEW DRUG ADDON: CPT

## 2019-06-28 PROCEDURE — 96374 THER/PROPH/DIAG INJ IV PUSH: CPT

## 2019-06-28 PROCEDURE — 85025 COMPLETE CBC W/AUTO DIFF WBC: CPT

## 2019-06-28 PROCEDURE — 6370000000 HC RX 637 (ALT 250 FOR IP): Performed by: INTERNAL MEDICINE

## 2019-06-28 PROCEDURE — 85014 HEMATOCRIT: CPT

## 2019-06-28 PROCEDURE — 85018 HEMOGLOBIN: CPT

## 2019-06-28 PROCEDURE — 80048 BASIC METABOLIC PNL TOTAL CA: CPT

## 2019-06-28 PROCEDURE — 6370000000 HC RX 637 (ALT 250 FOR IP): Performed by: PSYCHIATRY & NEUROLOGY

## 2019-06-28 PROCEDURE — 82962 GLUCOSE BLOOD TEST: CPT

## 2019-06-28 PROCEDURE — 2580000003 HC RX 258: Performed by: INTERNAL MEDICINE

## 2019-06-28 PROCEDURE — G0328 FECAL BLOOD SCRN IMMUNOASSAY: HCPCS

## 2019-06-28 PROCEDURE — 83735 ASSAY OF MAGNESIUM: CPT

## 2019-06-28 PROCEDURE — 6360000002 HC RX W HCPCS: Performed by: INTERNAL MEDICINE

## 2019-06-28 PROCEDURE — 96372 THER/PROPH/DIAG INJ SC/IM: CPT

## 2019-06-28 PROCEDURE — 80076 HEPATIC FUNCTION PANEL: CPT

## 2019-06-28 PROCEDURE — 84100 ASSAY OF PHOSPHORUS: CPT

## 2019-06-28 PROCEDURE — G0378 HOSPITAL OBSERVATION PER HR: HCPCS

## 2019-06-28 RX ORDER — AMLODIPINE BESYLATE 2.5 MG/1
2.5 TABLET ORAL DAILY
Qty: 30 TABLET | Refills: 3 | Status: SHIPPED | OUTPATIENT
Start: 2019-06-29

## 2019-06-28 RX ADMIN — AMLODIPINE BESYLATE 2.5 MG: 2.5 TABLET ORAL at 08:37

## 2019-06-28 RX ADMIN — ACETAMINOPHEN 162.5 MG: 325 TABLET ORAL at 13:40

## 2019-06-28 RX ADMIN — SODIUM CHLORIDE: 9 INJECTION, SOLUTION INTRAVENOUS at 00:11

## 2019-06-28 RX ADMIN — MIRTAZAPINE 15 MG: 15 TABLET, FILM COATED ORAL at 00:13

## 2019-06-28 RX ADMIN — GLIPIZIDE 5 MG: 5 TABLET ORAL at 06:46

## 2019-06-28 RX ADMIN — DOXYCYCLINE HYCLATE 100 MG: 100 CAPSULE ORAL at 00:13

## 2019-06-28 RX ADMIN — ENOXAPARIN SODIUM 40 MG: 40 INJECTION SUBCUTANEOUS at 08:39

## 2019-06-28 RX ADMIN — TRAMADOL HYDROCHLORIDE 25 MG: 50 TABLET, FILM COATED ORAL at 08:38

## 2019-06-28 RX ADMIN — CETIRIZINE HYDROCHLORIDE 5 MG: 10 TABLET, FILM COATED ORAL at 08:37

## 2019-06-28 RX ADMIN — FAMOTIDINE 20 MG: 20 TABLET, FILM COATED ORAL at 08:37

## 2019-06-28 RX ADMIN — ACETAMINOPHEN 162.5 MG: 325 TABLET ORAL at 00:12

## 2019-06-28 RX ADMIN — ACETAMINOPHEN 162.5 MG: 325 TABLET ORAL at 08:38

## 2019-06-28 RX ADMIN — GLIPIZIDE 5 MG: 5 TABLET ORAL at 16:02

## 2019-06-28 RX ADMIN — BUSPIRONE HYDROCHLORIDE 10 MG: 10 TABLET ORAL at 08:41

## 2019-06-28 RX ADMIN — TRAMADOL HYDROCHLORIDE 25 MG: 50 TABLET, FILM COATED ORAL at 13:40

## 2019-06-28 RX ADMIN — BUSPIRONE HYDROCHLORIDE 10 MG: 10 TABLET ORAL at 00:20

## 2019-06-28 RX ADMIN — BUSPIRONE HYDROCHLORIDE 10 MG: 10 TABLET ORAL at 13:40

## 2019-06-28 RX ADMIN — DOXYCYCLINE HYCLATE 100 MG: 100 CAPSULE ORAL at 08:37

## 2019-06-28 RX ADMIN — SUCRALFATE 1 G: 1 TABLET ORAL at 08:39

## 2019-06-28 RX ADMIN — TRAMADOL HYDROCHLORIDE 25 MG: 50 TABLET, FILM COATED ORAL at 00:13

## 2019-06-28 RX ADMIN — SUCRALFATE 1 G: 1 TABLET ORAL at 00:13

## 2019-06-28 RX ADMIN — FAMOTIDINE 20 MG: 20 TABLET, FILM COATED ORAL at 00:12

## 2019-06-28 RX ADMIN — ESCITALOPRAM OXALATE 20 MG: 10 TABLET ORAL at 08:39

## 2019-06-28 RX ADMIN — BUMETANIDE 0.5 MG: 1 TABLET ORAL at 08:36

## 2019-06-28 RX ADMIN — HYDROXYZINE HYDROCHLORIDE 10 MG: 10 TABLET ORAL at 08:37

## 2019-06-28 ASSESSMENT — PAIN SCALES - GENERAL
PAINLEVEL_OUTOF10: 0
PAINLEVEL_OUTOF10: 5
PAINLEVEL_OUTOF10: 4
PAINLEVEL_OUTOF10: 0
PAINLEVEL_OUTOF10: 0

## 2019-06-28 NOTE — PROGRESS NOTES
PROGRESS  NOTE --                                                          INTERNAL  MEDICINE                                                                              I  PERSONALLY SAW , EXAMINED, AND CARED FOR  [address], 6/28/2019     LABS, XRAY ,CHART, AND MEDICATIONS  REVIEWED BY ME .      PATIENT PROBLEM LIST:  Principal Problem:    Vertigo  Active Problems:    Diabetes mellitus (HCC)    Hypothyroid    Depression    Aortic regurgitation    Stage 3 chronic kidney disease (HCC)    Nervous breakdown    Asthma    Allergic rhinitis    Sphenoid sinusitis    Diabetic peripheral neuropathy ([address]  Resolved Problems:    * No resolved hospital problems. *           6/28/19-SUBJECTIVE: Arlyn Rivera is alert awake and cooperative; oriented ×3. Denies any chest pain dyspnea nausea emesis. Tolerating diet. No abdominal pain. Patient was seen yesterday by psychiatry service; consult appreciated. I contacted her son-in-law Juanis Mendoza MD; they seem happy with current progress. Only difficulty is the drop in hemoglobin. Temperature 97.9 respirations 16 pulse 62 blood pressure 159/66.  93% saturation on room air. Sodium 144 potassium 4.0 chloride 109 CO2 26 BUN 14 creatinine 0.8 magnesium 1.9 glucose 110 calcium 8.2 phosphorus 3.1 protein 6.4 albumin 3.7 liver functions normal.  TSH 2.43. WBC 7.3 hemoglobin 10.4; had been 10.2 on admission. Platelets 696. Blood cultures negative to date; urine culture pending. Carotid ultrasound negative; MRI of brain with following results--    Indication:  Acute vertigo. Suspected STROKE   Comparison: CT brain June 22, 2019. Technique: MRI brain noncontrast standard protocol  Findings: No evidence of recent infarct. There is moderate atrophy and  mild chronic microvascular ischemic disease.  No evidence of any mass,  hemorrhage or midline shift.      Impression:       Moderate atrophy outpatient  Prescriptions written by psychiatry for Remeron as well as Ativan. Discussed with patient and nursing. Jaison Santana DO     10:29 AM     6/28/2019    TIME > 85 MINUTES    >  50 %  OF  TIME  DISCUSSION     Active Hospital Problems    Diagnosis    Stage 3 chronic kidney disease ([facility] [N18.3]    Nervous breakdown [F48.8]    Asthma [J45.909]    Allergic rhinitis [J30.9]    Sphenoid sinusitis [J32.3]    Diabetic peripheral neuropathy (HCC) [E11.42]    Vertigo [R42]    Aortic regurgitation [I35.1]    Depression [F32.9]    Hypothyroid [E03.9]    Diabetes mellitus ([facility] [E11.9]                 ------------  INFORMATION  -----------      DIET:DIET CARB CONTROL;         Allergies   Allergen Reactions    Celexa [Citalopram Hydrobromide]     Ciprofloxacin     Keflex [Cephalexin]     Sulfa Antibiotics     Victoza [Liraglutide]          MEDICATION SIDE EFFECTS:none       SCHEDULED MEDS:                                 Current Facility-Administered Medications   Medication Dose Route Frequency Provider Last Rate Last Dose    doxycycline hyclate (VIBRAMYCIN) capsule 100 mg  100 mg Oral BID Jaison Santana DO   100 mg at 06/28/19 0837    sodium chloride (OCEAN, BABY AYR) 0.65 % nasal spray 1 spray  1 spray Each Nostril PRN Jaison Santana DO        phenylephrine (MIKE-SYNEPHRINE) 1 % nasal spray 1 spray  1 spray Each Nostril Q6H PRN Jaison Santana DO        amLODIPine (NORVASC) tablet 2.5 mg  2.5 mg Oral Daily Jace Santana DO   2.5 mg at 06/28/19 9271    LORazepam (ATIVAN) tablet 0.5 mg  0.5 mg Oral BID PRN Carrie Sears MD        mirtazapine (REMERON) tablet 15 mg  15 mg Oral Nightly Carrie Sears MD   15 mg at 06/28/19 0013    albuterol (ACCUNEB) nebulizer solution 0.63 mg  1 ampule Nebulization Q12H PRN Jaison Santana DO        bumetanide (BUMEX) tablet 0.5 mg  0.5 mg Oral Daily Jace Santana DO   0.5 mg at 06/28/19 0836    busPIRone (BUSPAR) tablet 10 mg  10 mg Oral TID 06/28/2019    LYMPHOPCT 46.7 06/28/2019    MONOPCT 6.9 06/28/2019    BASOPCT 0.7 06/28/2019    MONOSABS 0.50 06/28/2019    LYMPHSABS 3.39 06/28/2019    EOSABS 0.12 06/28/2019    BASOSABS 0.05 06/28/2019     Platelets:    Lab Results   Component Value Date     06/28/2019     Hemoglobin/Hematocrit:    Lab Results   Component Value Date    HGB 10.4 06/28/2019    HCT 30.7 06/28/2019     CMP:    Lab Results   Component Value Date     06/28/2019    K 4.0 06/28/2019     06/28/2019    CO2 26 06/28/2019    BUN 14 06/28/2019    CREATININE 0.8 06/28/2019    GFRAA >60 06/28/2019    LABGLOM >60 06/28/2019    GLUCOSE 110 06/28/2019    PROT 6.4 06/28/2019    LABALBU 3.7 06/28/2019    CALCIUM 8.2 06/28/2019    BILITOT 0.3 06/28/2019    ALKPHOS 39 06/28/2019    AST 17 06/28/2019    ALT 11 06/28/2019     Hepatic Function Panel:    Lab Results   Component Value Date    ALKPHOS 39 06/28/2019    ALT 11 06/28/2019    AST 17 06/28/2019    PROT 6.4 06/28/2019    BILITOT 0.3 06/28/2019    BILIDIR <0.2 06/28/2019    IBILI see below 06/28/2019    LABALBU 3.7 06/28/2019     Magnesium:    Lab Results   Component Value Date    MG 1.9 06/28/2019     Phosphorus:    Lab Results   Component Value Date    PHOS 3.1 06/28/2019     Uric Acid:    Lab Results   Component Value Date    LABURIC 7.3 06/27/2019     PT/INR:  No results found for: PROTIME, INR  Warfarin PT/INR:  No components found for: PTPATWAR, PTINRWAR  PTT:  No results found for: APTT, PTT[APTT}  Troponin:    Lab Results   Component Value Date    TROPONINI <0.01 06/26/2019     Last 3 Troponin:    Lab Results   Component Value Date    TROPONINI <0.01 06/26/2019    TROPONINI <0.01 06/22/2019     U/A:    Lab Results   Component Value Date    COLORU Yellow 06/26/2019    PROTEINU Negative 06/26/2019    PHUR 5.0 06/26/2019    WBCUA 1-3 06/26/2019    RBCUA NONE 06/26/2019    BACTERIA NONE 06/26/2019    CLARITYU Clear 06/26/2019    SPECGRAV 1.010 06/26/2019    LEUKOCYTESUR 32*       LIPASE , AMYLASE:  No results found for: LIPASE   No results found for: AMYLASE    ABGs: No results found for: PH, PO2, PCO2    CARDIAC:   Recent Labs     06/26/19  1532 06/26/19  2200   CKTOTAL  --  99   TROPONINI <0.01  --        Lipid Profile:   Lab Results   Component Value Date    TRIG 142 06/27/2019    HDL 47 06/27/2019    LDLCALC 135 06/27/2019    CHOL 210 06/27/2019        Lab Results   Component Value Date    LABA1C 5.8 (H) 06/26/2019            RADIOLOGY: REVIEWED AVAILABLE REPORT  MRI Brain WO Contrast   Final Result   Moderate atrophy and mild chronic microvascular ischemic   disease. No evidence of recent infarct. Nothing else active. US CAROTID ARTERY BILATERAL   Final Result      No evidence for hemodynamically significant stenosis of the carotid   arteries based on NASCET criteria (0-49%)      No evidence for subclavian steal.      CTA CHEST W CONTRAST   Final Result   1. No acute pulmonary emboli. 2. No aneurysm formation dissection the thoracic aorta. 3. No acute infiltrates in the lung parenchyma or pleural effusions. .      XR CHEST PORTABLE   Final Result   1. Enlarged cardiomediastinal silhouette, the possibility of   underlying pericardial effusion or other cardiac abnormalities are not   excluded on the basis of this exam, clinical correlation recommended. .   2. Central pulmonary vascular congestion. 3. Vascular calcifications thoracic aorta people. 4. Nonspecific bibasilar airspace disease, findings can be seen in   infiltrate/pneumonia and/or atelectasis. [place] ROJAS   10:29 AM     6/28/2019      Voice recognition software used for dictation

## 2019-06-28 NOTE — PROGRESS NOTES
[facility] Quality Flow/Interdisciplinary Rounds Progress Note        Quality Flow Rounds held on June 28, 2019    Disciplines Attending:  Bedside Nurse, ,  and Nursing Unit Leadership    Chrystal Keane was admitted on 6/26/2019  2:56 PM    Anticipated Discharge Date:  Expected Discharge Date: 06/29/19    Disposition:    Jose Angel Score:  Jose Angel Scale Score: 20    Readmission Risk              Risk of Unplanned Readmission:        15           Discussed patient goal for the day, patient clinical progression, and barriers to discharge. The following Goal(s) of the Day/Commitment(s) have been identified:  discharge today.       Tasha Wilkinson  June 28, 2019

## 2019-06-28 NOTE — PLAN OF CARE
Problem: Pain:  Goal: Pain level will decrease  Description  Pain level will decrease  6/28/2019 0357 by Law Berg RN  Outcome: Met This Shift     Problem: Falls - Risk of:  Goal: Will remain free from falls  Description  Will remain free from falls  6/28/2019 0357 by Law Berg RN  Outcome: Met This Shift

## 2019-06-29 LAB — URINE CULTURE, ROUTINE: NORMAL

## 2019-06-30 LAB — OCCULT BLOOD SCREENING: NORMAL

## 2019-07-02 LAB
BLOOD CULTURE, ROUTINE: NORMAL
CULTURE, BLOOD 2: NORMAL

## 2019-09-09 LAB

## 2019-09-10 LAB
AVERAGE GLUCOSE: 117
HBA1C MFR BLD: 5.7 %

## 2019-12-30 LAB
BASOPHILS ABSOLUTE: NORMAL
BASOPHILS RELATIVE PERCENT: NORMAL
BUN BLDV-MCNC: NORMAL MG/DL
CALCIUM SERPL-MCNC: NORMAL MG/DL
CHLORIDE BLD-SCNC: NORMAL MMOL/L
CO2: NORMAL
CREAT SERPL-MCNC: NORMAL MG/DL
EOSINOPHILS ABSOLUTE: NORMAL
EOSINOPHILS RELATIVE PERCENT: NORMAL
GFR CALCULATED: NORMAL
GLUCOSE BLD-MCNC: NORMAL MG/DL
HCT VFR BLD CALC: NORMAL %
HEMOGLOBIN: NORMAL
LYMPHOCYTES ABSOLUTE: NORMAL
LYMPHOCYTES RELATIVE PERCENT: NORMAL
MCH RBC QN AUTO: NORMAL PG
MCHC RBC AUTO-ENTMCNC: NORMAL G/DL
MCV RBC AUTO: NORMAL FL
MONOCYTES ABSOLUTE: NORMAL
MONOCYTES RELATIVE PERCENT: NORMAL
NEUTROPHILS ABSOLUTE: NORMAL
NEUTROPHILS RELATIVE PERCENT: NORMAL
PLATELET # BLD: NORMAL 10*3/UL
PMV BLD AUTO: NORMAL FL
POTASSIUM SERPL-SCNC: NORMAL MMOL/L
RBC # BLD: NORMAL 10*6/UL
SODIUM BLD-SCNC: NORMAL MMOL/L
WBC # BLD: NORMAL 10*3/UL

## 2020-03-02 LAB — DIABETIC RETINOPATHY: NEGATIVE

## 2020-08-20 VITALS
SYSTOLIC BLOOD PRESSURE: 120 MMHG | HEIGHT: 62 IN | WEIGHT: 173 LBS | BODY MASS INDEX: 31.83 KG/M2 | DIASTOLIC BLOOD PRESSURE: 76 MMHG

## 2020-08-20 RX ORDER — ZOLPIDEM TARTRATE 10 MG/1
10 TABLET ORAL NIGHTLY PRN
COMMUNITY

## 2020-08-20 RX ORDER — ALLOPURINOL 100 MG/1
100 TABLET ORAL DAILY
COMMUNITY

## 2020-08-20 RX ORDER — MOMETASONE FUROATE 100 UG/1
2 AEROSOL RESPIRATORY (INHALATION) 2 TIMES DAILY
COMMUNITY

## 2020-11-09 ENCOUNTER — HOSPITAL ENCOUNTER (OUTPATIENT)
Age: 85
Discharge: HOME | End: 2020-11-11
Payer: MEDICAID

## 2020-11-09 ENCOUNTER — HOSPITAL ENCOUNTER (OUTPATIENT)
Dept: GENERAL RADIOLOGY | Age: 85
Discharge: HOME | End: 2020-11-11
Payer: MEDICAID

## 2020-11-09 PROCEDURE — 73030 X-RAY EXAM OF SHOULDER: CPT

## 2021-03-01 ENCOUNTER — HOSPITAL ENCOUNTER (OUTPATIENT)
Age: 86
Discharge: HOME | End: 2021-03-03
Payer: MEDICAID

## 2021-03-01 ENCOUNTER — HOSPITAL ENCOUNTER (OUTPATIENT)
Dept: GENERAL RADIOLOGY | Age: 86
Discharge: HOME | End: 2021-03-03
Payer: MEDICAID

## 2021-03-01 DIAGNOSIS — R06.02 SHORTNESS OF BREATH: ICD-10-CM

## 2021-03-01 PROCEDURE — 71046 X-RAY EXAM CHEST 2 VIEWS: CPT

## 2021-07-28 DIAGNOSIS — K11.20 SIALADENITIS: Primary | ICD-10-CM

## 2021-07-28 RX ORDER — AMOXICILLIN 500 MG/1
500 CAPSULE ORAL 2 TIMES DAILY
Qty: 10 CAPSULE | Refills: 0 | Status: SHIPPED | OUTPATIENT
Start: 2021-07-28 | End: 2021-08-02

## 2021-12-23 ENCOUNTER — HOSPITAL ENCOUNTER (OUTPATIENT)
Dept: GENERAL RADIOLOGY | Age: 86
Discharge: HOME | End: 2021-12-25
Payer: MEDICAID

## 2021-12-23 ENCOUNTER — HOSPITAL ENCOUNTER (OUTPATIENT)
Age: 86
Discharge: HOME | End: 2021-12-25
Payer: MEDICAID

## 2021-12-23 DIAGNOSIS — M54.50 LUMBAR PAIN: ICD-10-CM

## 2021-12-23 PROCEDURE — 72110 X-RAY EXAM L-2 SPINE 4/>VWS: CPT

## 2022-10-28 DIAGNOSIS — B00.9 HERPES SIMPLEX: Primary | ICD-10-CM

## 2022-10-28 RX ORDER — VALACYCLOVIR HYDROCHLORIDE 500 MG/1
500 TABLET, FILM COATED ORAL 2 TIMES DAILY
Qty: 14 TABLET | Refills: 2 | Status: SHIPPED | OUTPATIENT
Start: 2022-10-28 | End: 2022-11-04

## 2022-11-07 DIAGNOSIS — K11.20 SIALADENITIS: Primary | ICD-10-CM

## 2022-11-07 RX ORDER — AMOXICILLIN 500 MG/1
500 CAPSULE ORAL 2 TIMES DAILY
Qty: 14 CAPSULE | Refills: 0 | Status: SHIPPED | OUTPATIENT
Start: 2022-11-07 | End: 2022-11-14

## 2023-03-08 ENCOUNTER — APPOINTMENT (OUTPATIENT)
Dept: GENERAL RADIOLOGY | Age: 88
End: 2023-03-08
Payer: COMMERCIAL

## 2023-03-08 ENCOUNTER — APPOINTMENT (OUTPATIENT)
Dept: CT IMAGING | Age: 88
End: 2023-03-08
Payer: COMMERCIAL

## 2023-03-08 ENCOUNTER — HOSPITAL ENCOUNTER (INPATIENT)
Age: 88
LOS: 1 days | Discharge: HOME | End: 2023-03-09
Attending: EMERGENCY MEDICINE | Admitting: FAMILY MEDICINE
Payer: COMMERCIAL

## 2023-03-08 DIAGNOSIS — R41.82 ALTERED MENTAL STATUS, UNSPECIFIED ALTERED MENTAL STATUS TYPE: Primary | ICD-10-CM

## 2023-03-08 DIAGNOSIS — Z60.2 LIVES ALONE: ICD-10-CM

## 2023-03-08 DIAGNOSIS — R29.6 FREQUENT FALLS: ICD-10-CM

## 2023-03-08 LAB
ALBUMIN SERPL-MCNC: 4 G/DL (ref 3.5–5.2)
ALP BLD-CCNC: 49 U/L (ref 35–104)
ALT SERPL-CCNC: 15 U/L (ref 0–32)
ANION GAP SERPL CALCULATED.3IONS-SCNC: 8 MMOL/L (ref 7–16)
AST SERPL-CCNC: 29 U/L (ref 0–31)
BASOPHILS ABSOLUTE: 0.05 E9/L (ref 0–0.2)
BASOPHILS RELATIVE PERCENT: 0.6 % (ref 0–2)
BILIRUB SERPL-MCNC: 0.4 MG/DL (ref 0–1.2)
BILIRUBIN URINE: NEGATIVE
BLOOD, URINE: NEGATIVE
BUN BLDV-MCNC: 12 MG/DL (ref 6–23)
CALCIUM SERPL-MCNC: 9.1 MG/DL (ref 8.6–10.2)
CHLORIDE BLD-SCNC: 100 MMOL/L (ref 98–107)
CLARITY: CLEAR
CO2: 29 MMOL/L (ref 22–29)
COLOR: YELLOW
CREAT SERPL-MCNC: 0.9 MG/DL (ref 0.5–1)
EOSINOPHILS ABSOLUTE: 0.09 E9/L (ref 0.05–0.5)
EOSINOPHILS RELATIVE PERCENT: 1 % (ref 0–6)
GFR SERPL CREATININE-BSD FRML MDRD: >60 ML/MIN/1.73
GLUCOSE BLD-MCNC: 78 MG/DL (ref 74–99)
GLUCOSE URINE: NEGATIVE MG/DL
HCT VFR BLD CALC: 37.4 % (ref 34–48)
HEMOGLOBIN: 12.6 G/DL (ref 11.5–15.5)
IMMATURE GRANULOCYTES #: 0.02 E9/L
IMMATURE GRANULOCYTES %: 0.2 % (ref 0–5)
KETONES, URINE: NEGATIVE MG/DL
LACTIC ACID: 1.2 MMOL/L (ref 0.5–2.2)
LEUKOCYTE ESTERASE, URINE: NEGATIVE
LIPASE: 21 U/L (ref 13–60)
LYMPHOCYTES ABSOLUTE: 2.93 E9/L (ref 1.5–4)
LYMPHOCYTES RELATIVE PERCENT: 32.4 % (ref 20–42)
MCH RBC QN AUTO: 31.3 PG (ref 26–35)
MCHC RBC AUTO-ENTMCNC: 33.7 % (ref 32–34.5)
MCV RBC AUTO: 92.8 FL (ref 80–99.9)
METER GLUCOSE: 139 MG/DL (ref 74–99)
MONOCYTES ABSOLUTE: 0.57 E9/L (ref 0.1–0.95)
MONOCYTES RELATIVE PERCENT: 6.3 % (ref 2–12)
NEUTROPHILS ABSOLUTE: 5.38 E9/L (ref 1.8–7.3)
NEUTROPHILS RELATIVE PERCENT: 59.5 % (ref 43–80)
NITRITE, URINE: NEGATIVE
PDW BLD-RTO: 13.3 FL (ref 11.5–15)
PH UA: 6 (ref 5–9)
PLATELET # BLD: 194 E9/L (ref 130–450)
PMV BLD AUTO: 11.8 FL (ref 7–12)
POTASSIUM SERPL-SCNC: 4 MMOL/L (ref 3.5–5)
PROTEIN UA: NEGATIVE MG/DL
RBC # BLD: 4.03 E12/L (ref 3.5–5.5)
SARS-COV-2, NAAT: NOT DETECTED
SODIUM BLD-SCNC: 137 MMOL/L (ref 132–146)
SPECIFIC GRAVITY UA: 1.01 (ref 1–1.03)
TOTAL CK: 52 U/L (ref 20–180)
TOTAL PROTEIN: 7.3 G/DL (ref 6.4–8.3)
TROPONIN, HIGH SENSITIVITY: 23 NG/L (ref 0–9)
TROPONIN, HIGH SENSITIVITY: 24 NG/L (ref 0–9)
TSH SERPL DL<=0.05 MIU/L-ACNC: 2.11 UIU/ML (ref 0.27–4.2)
UROBILINOGEN, URINE: 0.2 E.U./DL
WBC # BLD: 9 E9/L (ref 4.5–11.5)

## 2023-03-08 PROCEDURE — 70450 CT HEAD/BRAIN W/O DYE: CPT

## 2023-03-08 PROCEDURE — 73552 X-RAY EXAM OF FEMUR 2/>: CPT

## 2023-03-08 PROCEDURE — 87635 SARS-COV-2 COVID-19 AMP PRB: CPT

## 2023-03-08 PROCEDURE — 72125 CT NECK SPINE W/O DYE: CPT

## 2023-03-08 PROCEDURE — 6360000002 HC RX W HCPCS

## 2023-03-08 PROCEDURE — 2580000003 HC RX 258

## 2023-03-08 PROCEDURE — 99285 EMERGENCY DEPT VISIT HI MDM: CPT

## 2023-03-08 PROCEDURE — 71045 X-RAY EXAM CHEST 1 VIEW: CPT

## 2023-03-08 PROCEDURE — 82962 GLUCOSE BLOOD TEST: CPT

## 2023-03-08 PROCEDURE — 6370000000 HC RX 637 (ALT 250 FOR IP)

## 2023-03-08 PROCEDURE — 81003 URINALYSIS AUTO W/O SCOPE: CPT

## 2023-03-08 PROCEDURE — 84443 ASSAY THYROID STIM HORMONE: CPT

## 2023-03-08 PROCEDURE — 93005 ELECTROCARDIOGRAM TRACING: CPT | Performed by: EMERGENCY MEDICINE

## 2023-03-08 PROCEDURE — 82550 ASSAY OF CK (CPK): CPT

## 2023-03-08 PROCEDURE — 83690 ASSAY OF LIPASE: CPT

## 2023-03-08 PROCEDURE — 85025 COMPLETE CBC W/AUTO DIFF WBC: CPT

## 2023-03-08 PROCEDURE — 83605 ASSAY OF LACTIC ACID: CPT

## 2023-03-08 PROCEDURE — 73564 X-RAY EXAM KNEE 4 OR MORE: CPT

## 2023-03-08 PROCEDURE — 84484 ASSAY OF TROPONIN QUANT: CPT

## 2023-03-08 PROCEDURE — 1200000000 HC SEMI PRIVATE

## 2023-03-08 PROCEDURE — 87088 URINE BACTERIA CULTURE: CPT

## 2023-03-08 PROCEDURE — 80053 COMPREHEN METABOLIC PANEL: CPT

## 2023-03-08 RX ORDER — POLYETHYLENE GLYCOL 3350 17 G/17G
17 POWDER, FOR SOLUTION ORAL DAILY PRN
Status: DISCONTINUED | OUTPATIENT
Start: 2023-03-08 | End: 2023-03-09 | Stop reason: HOSPADM

## 2023-03-08 RX ORDER — MONTELUKAST SODIUM 10 MG/1
10 TABLET ORAL NIGHTLY
Status: DISCONTINUED | OUTPATIENT
Start: 2023-03-08 | End: 2023-03-09 | Stop reason: HOSPADM

## 2023-03-08 RX ORDER — PAROXETINE 10 MG/1
10 TABLET, FILM COATED ORAL DAILY
COMMUNITY

## 2023-03-08 RX ORDER — GABAPENTIN 100 MG/1
100 CAPSULE ORAL 3 TIMES DAILY
Status: DISCONTINUED | OUTPATIENT
Start: 2023-03-08 | End: 2023-03-09 | Stop reason: HOSPADM

## 2023-03-08 RX ORDER — ALLOPURINOL 100 MG/1
100 TABLET ORAL DAILY
Status: DISCONTINUED | OUTPATIENT
Start: 2023-03-08 | End: 2023-03-08

## 2023-03-08 RX ORDER — MIRTAZAPINE 15 MG/1
15 TABLET, FILM COATED ORAL NIGHTLY
Status: DISCONTINUED | OUTPATIENT
Start: 2023-03-08 | End: 2023-03-08

## 2023-03-08 RX ORDER — ALBUTEROL SULFATE 90 UG/1
2 AEROSOL, METERED RESPIRATORY (INHALATION) DAILY PRN
Status: DISCONTINUED | OUTPATIENT
Start: 2023-03-08 | End: 2023-03-08 | Stop reason: SDUPTHER

## 2023-03-08 RX ORDER — BUMETANIDE 1 MG/1
0.5 TABLET ORAL DAILY
Status: DISCONTINUED | OUTPATIENT
Start: 2023-03-09 | End: 2023-03-09 | Stop reason: HOSPADM

## 2023-03-08 RX ORDER — SODIUM CHLORIDE 0.9 % (FLUSH) 0.9 %
10 SYRINGE (ML) INJECTION PRN
Status: DISCONTINUED | OUTPATIENT
Start: 2023-03-08 | End: 2023-03-09 | Stop reason: HOSPADM

## 2023-03-08 RX ORDER — ACETAMINOPHEN 325 MG/1
650 TABLET ORAL EVERY 4 HOURS PRN
Status: DISCONTINUED | OUTPATIENT
Start: 2023-03-08 | End: 2023-03-09 | Stop reason: HOSPADM

## 2023-03-08 RX ORDER — HYDROXYZINE HYDROCHLORIDE 10 MG/1
10 TABLET, FILM COATED ORAL NIGHTLY
Status: DISCONTINUED | OUTPATIENT
Start: 2023-03-09 | End: 2023-03-09 | Stop reason: HOSPADM

## 2023-03-08 RX ORDER — FAMOTIDINE 20 MG/1
20 TABLET, FILM COATED ORAL NIGHTLY
Status: DISCONTINUED | OUTPATIENT
Start: 2023-03-08 | End: 2023-03-09 | Stop reason: HOSPADM

## 2023-03-08 RX ORDER — ACETAMINOPHEN 325 MG/1
650 TABLET ORAL EVERY 6 HOURS PRN
Status: DISCONTINUED | OUTPATIENT
Start: 2023-03-08 | End: 2023-03-09 | Stop reason: HOSPADM

## 2023-03-08 RX ORDER — BUDESONIDE 0.25 MG/2ML
0.25 INHALANT ORAL 2 TIMES DAILY
Status: DISCONTINUED | OUTPATIENT
Start: 2023-03-08 | End: 2023-03-08

## 2023-03-08 RX ORDER — DEXTROSE MONOHYDRATE 100 MG/ML
INJECTION, SOLUTION INTRAVENOUS CONTINUOUS PRN
Status: DISCONTINUED | OUTPATIENT
Start: 2023-03-08 | End: 2023-03-09 | Stop reason: HOSPADM

## 2023-03-08 RX ORDER — ACETAMINOPHEN 650 MG/1
650 SUPPOSITORY RECTAL EVERY 6 HOURS PRN
Status: DISCONTINUED | OUTPATIENT
Start: 2023-03-08 | End: 2023-03-09 | Stop reason: HOSPADM

## 2023-03-08 RX ORDER — SODIUM CHLORIDE 9 MG/ML
INJECTION, SOLUTION INTRAVENOUS PRN
Status: DISCONTINUED | OUTPATIENT
Start: 2023-03-08 | End: 2023-03-09 | Stop reason: HOSPADM

## 2023-03-08 RX ORDER — INSULIN LISPRO 100 [IU]/ML
0-4 INJECTION, SOLUTION INTRAVENOUS; SUBCUTANEOUS NIGHTLY
Status: DISCONTINUED | OUTPATIENT
Start: 2023-03-08 | End: 2023-03-09 | Stop reason: HOSPADM

## 2023-03-08 RX ORDER — AMLODIPINE BESYLATE 2.5 MG/1
2.5 TABLET ORAL DAILY
Status: DISCONTINUED | OUTPATIENT
Start: 2023-03-08 | End: 2023-03-08

## 2023-03-08 RX ORDER — CETIRIZINE HYDROCHLORIDE 10 MG/1
10 TABLET ORAL DAILY
Status: DISCONTINUED | OUTPATIENT
Start: 2023-03-09 | End: 2023-03-09 | Stop reason: HOSPADM

## 2023-03-08 RX ORDER — FAMOTIDINE 20 MG/1
20 TABLET, FILM COATED ORAL 2 TIMES DAILY
Status: DISCONTINUED | OUTPATIENT
Start: 2023-03-08 | End: 2023-03-08 | Stop reason: DRUGHIGH

## 2023-03-08 RX ORDER — SUCRALFATE 1 G/1
1 TABLET ORAL 2 TIMES DAILY
Status: DISCONTINUED | OUTPATIENT
Start: 2023-03-08 | End: 2023-03-09 | Stop reason: HOSPADM

## 2023-03-08 RX ORDER — MONTELUKAST SODIUM 10 MG/1
10 TABLET ORAL NIGHTLY
COMMUNITY

## 2023-03-08 RX ORDER — ONDANSETRON 2 MG/ML
4 INJECTION INTRAMUSCULAR; INTRAVENOUS EVERY 6 HOURS PRN
Status: DISCONTINUED | OUTPATIENT
Start: 2023-03-08 | End: 2023-03-09 | Stop reason: HOSPADM

## 2023-03-08 RX ORDER — ALBUTEROL SULFATE 0.63 MG/3ML
1 SOLUTION RESPIRATORY (INHALATION) EVERY 12 HOURS PRN
Status: DISCONTINUED | OUTPATIENT
Start: 2023-03-08 | End: 2023-03-08

## 2023-03-08 RX ORDER — INSULIN LISPRO 100 [IU]/ML
0-4 INJECTION, SOLUTION INTRAVENOUS; SUBCUTANEOUS
Status: DISCONTINUED | OUTPATIENT
Start: 2023-03-09 | End: 2023-03-09 | Stop reason: HOSPADM

## 2023-03-08 RX ORDER — BUSPIRONE HYDROCHLORIDE 5 MG/1
10 TABLET ORAL 2 TIMES DAILY
Status: DISCONTINUED | OUTPATIENT
Start: 2023-03-08 | End: 2023-03-09 | Stop reason: HOSPADM

## 2023-03-08 RX ORDER — BUSPIRONE HYDROCHLORIDE 5 MG/1
10 TABLET ORAL 3 TIMES DAILY
Status: DISCONTINUED | OUTPATIENT
Start: 2023-03-08 | End: 2023-03-08

## 2023-03-08 RX ORDER — SODIUM CHLORIDE 0.9 % (FLUSH) 0.9 %
5-40 SYRINGE (ML) INJECTION EVERY 12 HOURS SCHEDULED
Status: DISCONTINUED | OUTPATIENT
Start: 2023-03-08 | End: 2023-03-09 | Stop reason: HOSPADM

## 2023-03-08 RX ORDER — HYDROXYZINE HYDROCHLORIDE 10 MG/1
10 TABLET, FILM COATED ORAL DAILY
Status: DISCONTINUED | OUTPATIENT
Start: 2023-03-08 | End: 2023-03-08

## 2023-03-08 RX ORDER — ONDANSETRON 4 MG/1
4 TABLET, ORALLY DISINTEGRATING ORAL EVERY 8 HOURS PRN
Status: DISCONTINUED | OUTPATIENT
Start: 2023-03-08 | End: 2023-03-09 | Stop reason: HOSPADM

## 2023-03-08 RX ORDER — PAROXETINE 10 MG/1
10 TABLET, FILM COATED ORAL DAILY
Status: DISCONTINUED | OUTPATIENT
Start: 2023-03-08 | End: 2023-03-09 | Stop reason: HOSPADM

## 2023-03-08 RX ORDER — ENOXAPARIN SODIUM 100 MG/ML
40 INJECTION SUBCUTANEOUS DAILY
Status: DISCONTINUED | OUTPATIENT
Start: 2023-03-08 | End: 2023-03-09 | Stop reason: HOSPADM

## 2023-03-08 RX ORDER — ESCITALOPRAM OXALATE 10 MG/1
20 TABLET ORAL DAILY
Status: DISCONTINUED | OUTPATIENT
Start: 2023-03-09 | End: 2023-03-08

## 2023-03-08 RX ORDER — GLIPIZIDE 5 MG/1
5 TABLET ORAL
Status: DISCONTINUED | OUTPATIENT
Start: 2023-03-09 | End: 2023-03-09 | Stop reason: HOSPADM

## 2023-03-08 RX ORDER — ERGOCALCIFEROL (VITAMIN D2) 10 MCG
50000 TABLET ORAL DAILY
Status: DISCONTINUED | OUTPATIENT
Start: 2023-03-08 | End: 2023-03-08

## 2023-03-08 RX ADMIN — BUSPIRONE HYDROCHLORIDE 10 MG: 5 TABLET ORAL at 21:53

## 2023-03-08 RX ADMIN — FAMOTIDINE 20 MG: 20 TABLET, FILM COATED ORAL at 21:54

## 2023-03-08 RX ADMIN — PAROXETINE 10 MG: 10 TABLET, FILM COATED ORAL at 21:53

## 2023-03-08 RX ADMIN — ENOXAPARIN SODIUM 40 MG: 100 INJECTION SUBCUTANEOUS at 21:53

## 2023-03-08 RX ADMIN — GABAPENTIN 100 MG: 100 CAPSULE ORAL at 21:53

## 2023-03-08 RX ADMIN — ACETAMINOPHEN 650 MG: 325 TABLET ORAL at 22:01

## 2023-03-08 RX ADMIN — SODIUM CHLORIDE, PRESERVATIVE FREE 10 ML: 5 INJECTION INTRAVENOUS at 21:55

## 2023-03-08 RX ADMIN — SUCRALFATE 1 G: 1 TABLET ORAL at 21:53

## 2023-03-08 RX ADMIN — MONTELUKAST SODIUM 10 MG: 10 TABLET ORAL at 22:26

## 2023-03-08 SDOH — SOCIAL STABILITY - SOCIAL INSECURITY: PROBLEMS RELATED TO LIVING ALONE: Z60.2

## 2023-03-08 ASSESSMENT — PAIN - FUNCTIONAL ASSESSMENT
PAIN_FUNCTIONAL_ASSESSMENT: PREVENTS OR INTERFERES SOME ACTIVE ACTIVITIES AND ADLS
PAIN_FUNCTIONAL_ASSESSMENT: NONE - DENIES PAIN

## 2023-03-08 ASSESSMENT — PAIN SCALES - GENERAL: PAINLEVEL_OUTOF10: 5

## 2023-03-08 ASSESSMENT — LIFESTYLE VARIABLES
HOW MANY STANDARD DRINKS CONTAINING ALCOHOL DO YOU HAVE ON A TYPICAL DAY: PATIENT DOES NOT DRINK
HOW OFTEN DO YOU HAVE A DRINK CONTAINING ALCOHOL: NEVER
HOW MANY STANDARD DRINKS CONTAINING ALCOHOL DO YOU HAVE ON A TYPICAL DAY: PATIENT DOES NOT DRINK
HOW OFTEN DO YOU HAVE A DRINK CONTAINING ALCOHOL: NEVER

## 2023-03-08 ASSESSMENT — PAIN DESCRIPTION - DESCRIPTORS: DESCRIPTORS: ACHING

## 2023-03-08 ASSESSMENT — PAIN DESCRIPTION - ORIENTATION: ORIENTATION: RIGHT

## 2023-03-08 ASSESSMENT — PAIN DESCRIPTION - LOCATION: LOCATION: SHOULDER

## 2023-03-08 NOTE — ED PROVIDER NOTES
[facility] ENCOUNTER        Pt Name: Rosendo Rios  MRN: [id number]  [place] 1/13/1933  Date of evaluation: 3/8/2023  Provider: Waldo Chris DO  PCP: Lonny Márquez MD  Note Started: 3:26 PM EST 3/8/23    CHIEF COMPLAINT       Chief Complaint   Patient presents with    Altered Mental Status       HISTORY OF PRESENT ILLNESS: 1 or more Elements     History from : Patient and EMS    Limitations to history : Altered Mental Status    Rosendo Rios is a 80 y.o. female who presents for fall, last talked to daughter yesterday at 36pm on the phone, pt lives alone was found on floor at home today bought via ems, unknown how long pt was down, pt notes doesn't remember fall c/o left knee pain and ureteral discomfort. Daughter notes pt does have memory issues. Pt knows who the president is and is aaox2 but not date. Patient's daughter is here and notes patient lives alone and she would like social work and PT to evaluate for placement. Patient's son-in-law is Dr. Sugar Oconnell personally discussed case w.    Nursing Notes were all reviewed and agreed with or any disagreements were addressed in the HPI. REVIEW OF SYSTEMS :      Review of Systems   Unable to perform ROS: Mental status change     Positives and Pertinent negatives as per HPI.      SURGICAL HISTORY     Past Surgical History:   Procedure Laterality Date    APPENDECTOMY      CATARACT REMOVAL Left     CHOLECYSTECTOMY  1982    LAP SONNY    HYSTERECTOMY (CERVIX STATUS UNKNOWN)         CURRENTMEDICATIONS       Previous Medications    ALBUTEROL (ACCUNEB) 0.63 MG/3ML NEBULIZER SOLUTION    Take 1 ampule by nebulization every 12 hours as needed for Wheezing    ALBUTEROL SULFATE HFA (VENTOLIN HFA) 108 (90 BASE) MCG/ACT INHALER    Inhale 2 puffs into the lungs daily as needed for Wheezing    ALLOPURINOL (ZYLOPRIM) 100 MG TABLET    Take 100 mg by mouth daily AMLODIPINE (NORVASC) 2.5 MG TABLET    Take 1 tablet by mouth daily    BUMETANIDE (BUMEX) 0.5 MG TABLET    Take 0.5 mg by mouth daily    BUSPIRONE (BUSPAR) 10 MG TABLET    Take 10 mg by mouth 3 times daily    ERGOCALCIFEROL (VITAMIN D2 PO)    Take 50,000 Units by mouth    ESCITALOPRAM (LEXAPRO) 20 MG TABLET    Take 20 mg by mouth daily    FAMOTIDINE (PEPCID) 20 MG TABLET    Take 20 mg by mouth 2 times daily    GABAPENTIN (NEURONTIN) 100 MG CAPSULE    Take 100 mg by mouth 3 times daily. GLIPIZIDE (GLUCOTROL) 5 MG TABLET    Take 5 mg by mouth 2 times daily (before meals)    HYDROXYZINE (ATARAX) 10 MG TABLET    Take 10 mg by mouth daily    L-METHYLFOLATE-ALGAE-B12-B6 (METANX PO)    Take 1 tablet by mouth 2 times daily    L-METHYLFOLATE-B6-B12 (METANX) 8-52.162-4-11 MG CAPS CAPSULE    Take 1 capsule by mouth daily    LORATADINE (CLARITIN) 10 MG CAPSULE    Take 10 mg by mouth daily    METFORMIN (GLUCOPHAGE) 500 MG TABLET    Take 500 mg by mouth 2 times daily (with meals)    MIRTAZAPINE (REMERON) 15 MG TABLET    Take 1 tablet by mouth nightly    MOMETASONE (ASMANEX HFA) 100 MCG/ACT AERO INHALER    Inhale 2 puffs into the lungs 2 times daily    SODIUM CHLORIDE (OCEAN, BABY AYR) 0.65 % NASAL SPRAY    1 spray by Nasal route as needed for Congestion    SUCRALFATE (CARAFATE) 1 GM TABLET    Take 1 g by mouth 2 times daily    TRAMADOL-ACETAMINOPHEN (ULTRACET PO)    Take 37.5 mg by mouth 3 times daily Indications: half a tablet 3 times a day    VITAMIN D PO    Take 1,250 mcg by mouth daily    ZOLPIDEM (AMBIEN) 10 MG TABLET    Take 10 mg by mouth nightly as needed for Sleep.        ALLERGIES     Sulfa antibiotics    FAMILYHISTORY       Family History   Problem Relation Age of Onset    Diabetes Mother     Heart Disease Mother     Cancer Father         Pancreatic CA        SOCIAL HISTORY       Social History     Tobacco Use    Smoking status: Never    Smokeless tobacco: Never   Substance Use Topics    Alcohol use: No    Drug use: No       SCREENINGS        [place] Coma Scale  Eye Opening: Spontaneous  Best Verbal Response: Confused  Best Motor Response: Obeys commands  [place]i Coma Scale Score: 14                CIWA Assessment  BP: (!) 121/99  Heart Rate: 61           PHYSICAL EXAM  1 or more Elements     ED Triage Vitals   BP Temp Temp src Pulse Resp SpO2 Height Weight   -- -- -- -- -- -- -- --       Physical Exam  Vitals reviewed. Constitutional:       General: She is not in acute distress. Appearance: Normal appearance. She is not toxic-appearing. HENT:      Head: Normocephalic and atraumatic. Right Ear: External ear normal.      Left Ear: External ear normal.      Nose: Nose normal. No congestion. Mouth/Throat:      Mouth: Mucous membranes are moist.      Pharynx: Oropharynx is clear. No posterior oropharyngeal erythema. Eyes:      Extraocular Movements: Extraocular movements intact. Pupils: Pupils are equal, round, and reactive to light. Cardiovascular:      Rate and Rhythm: Normal rate and regular rhythm. Pulses: Normal pulses. Heart sounds: No murmur heard. Pulmonary:      Effort: Pulmonary effort is normal.      Breath sounds: No wheezing or rhonchi. Chest:      Chest wall: No tenderness. Abdominal:      General: Bowel sounds are normal.      Tenderness: There is no abdominal tenderness. There is no right CVA tenderness, left CVA tenderness or guarding. Musculoskeletal:         General: No swelling or deformity. Cervical back: Normal range of motion and neck supple. No muscular tenderness. Skin:     General: Skin is warm and dry. Capillary Refill: Capillary refill takes less than 2 seconds. Findings: No erythema. Neurological:      General: No focal deficit present. Mental Status: She is alert. She is confused. GCS: GCS eye subscore is 4. GCS verbal subscore is 4. GCS motor subscore is 6. Cranial Nerves: No cranial nerve deficit.       Sensory: No sensory deficit. Deep Tendon Reflexes: Reflexes normal.   Psychiatric:         Mood and Affect: Mood normal. Mood is not anxious.            DIAGNOSTIC RESULTS   LABS:    Labs Reviewed   TROPONIN - Abnormal; Notable for the following components:       Result Value    Troponin, High Sensitivity 24 (*)     All other components within normal limits   COVID-19, RAPID   CULTURE, URINE   CBC WITH AUTO DIFFERENTIAL   COMPREHENSIVE METABOLIC PANEL   CK   LACTIC ACID   LIPASE   TSH   URINALYSIS   TROPONIN    Narrative:     High Sensitivity Troponin T     Results for orders placed or performed during the hospital encounter of 03/08/23   COVID-19, Rapid    Specimen: Nasopharyngeal Swab   Result Value Ref Range    SARS-CoV-2, NAAT Not Detected Not Detected   CBC with Auto Differential   Result Value Ref Range    WBC 9.0 4.5 - 11.5 E9/L    RBC 4.03 3.50 - 5.50 E12/L    Hemoglobin 12.6 11.5 - 15.5 g/dL    Hematocrit 37.4 34.0 - 48.0 %    MCV 92.8 80.0 - 99.9 fL    MCH 31.3 26.0 - 35.0 pg    MCHC 33.7 32.0 - 34.5 %    RDW 13.3 11.5 - 15.0 fL    Platelets 009 992 - 438 E9/L    MPV 11.8 7.0 - 12.0 fL    Neutrophils % 59.5 43.0 - 80.0 %    Immature Granulocytes % 0.2 0.0 - 5.0 %    Lymphocytes % 32.4 20.0 - 42.0 %    Monocytes % 6.3 2.0 - 12.0 %    Eosinophils % 1.0 0.0 - 6.0 %    Basophils % 0.6 0.0 - 2.0 %    Neutrophils Absolute 5.38 1.80 - 7.30 E9/L    Immature Granulocytes # 0.02 E9/L    Lymphocytes Absolute 2.93 1.50 - 4.00 E9/L    Monocytes Absolute 0.57 0.10 - 0.95 E9/L    Eosinophils Absolute 0.09 0.05 - 0.50 E9/L    Basophils Absolute 0.05 0.00 - 0.20 E9/L   CMP   Result Value Ref Range    Sodium 137 132 - 146 mmol/L    Potassium 4.0 3.5 - 5.0 mmol/L    Chloride 100 98 - 107 mmol/L    CO2 29 22 - 29 mmol/L    Anion Gap 8 7 - 16 mmol/L    Glucose 78 74 - 99 mg/dL    BUN 12 6 - 23 mg/dL    Creatinine 0.9 0.5 - 1.0 mg/dL    Est, Glom Filt Rate >60 >=60 mL/min/1.73    Calcium 9.1 8.6 - 10.2 mg/dL    Total Protein 7.3 6.4 - 8.3 g/dL Albumin 4.0 3.5 - 5.2 g/dL    Total Bilirubin 0.4 0.0 - 1.2 mg/dL    Alkaline Phosphatase 49 35 - 104 U/L    ALT 15 0 - 32 U/L    AST 29 0 - 31 U/L   CK   Result Value Ref Range    Total CK 52 20 - 180 U/L   Lactic Acid   Result Value Ref Range    Lactic Acid 1.2 0.5 - 2.2 mmol/L   Lipase   Result Value Ref Range    Lipase 21 13 - 60 U/L   Troponin   Result Value Ref Range    Troponin, High Sensitivity 24 (H) 0 - 9 ng/L   TSH   Result Value Ref Range    TSH 2.110 0.270 - 4.200 uIU/mL   Urinalysis   Result Value Ref Range    Color, UA Yellow Straw/Yellow    Clarity, UA Clear Clear    Glucose, Ur Negative Negative mg/dL    Bilirubin Urine Negative Negative    Ketones, Urine Negative Negative mg/dL    Specific Gravity, UA 1.010 1.005 - 1.030    Blood, Urine Negative Negative    pH, UA 6.0 5.0 - 9.0    Protein, UA Negative Negative mg/dL    Urobilinogen, Urine 0.2 <2.0 E.U./dL    Nitrite, Urine Negative Negative    Leukocyte Esterase, Urine Negative Negative   EKG 12 Lead   Result Value Ref Range    Ventricular Rate 50 BPM    Atrial Rate 50 BPM    P-R Interval 304 ms    QRS Duration 76 ms    Q-T Interval 438 ms    QTc Calculation (Bazett) 399 ms    P Axis 69 degrees    R Axis -46 degrees    T Axis 1 degrees         When ordered only abnormal lab results are displayed. All other labs were within normal range or not returned as of this dictation.     EK-MAR-2023 16:33:46  Sinus bradycardia with 1st degree AV block  HR 50  Poor r wave progression in anterior leads new compared with prior ecg, no acute ischemia findings    RADIOLOGY:   Non-plain film images such as CT, Ultrasound and MRI are read by the radiologist. Plain radiographic images are visualized and preliminarily interpreted by the ED Provider with the below findings:    CAT scan showing no acute ischemic pathology, chest x-ray showing no pneumonia or pneumothorax, no acute fracture    Interpretation per the Radiologist below, if available at the time of this note:    XR CHEST PORTABLE   Final Result   No acute process. XR FEMUR LEFT (MIN 2 VIEWS)   Final Result   No acute osseous abnormality. XR KNEE LEFT (MIN 4 VIEWS)   Final Result   No acute abnormality of the knee. Small superior patellar spur. CT HEAD WO CONTRAST   Final Result   CT HEAD WITHOUT CONTRAST:      1. No skull fracture or acute intracranial abnormality. CT CERVICAL SPINE WITHOUT CONTRAST:      1. No fracture or joint dislocation is seen. 2. Degenerative changes, as described. CT CERVICAL SPINE WO CONTRAST   Final Result   CT HEAD WITHOUT CONTRAST:      1. No skull fracture or acute intracranial abnormality. CT CERVICAL SPINE WITHOUT CONTRAST:      1. No fracture or joint dislocation is seen. 2. Degenerative changes, as described. No results found. No results found. PROCEDURES   Unless otherwise noted below, none     Procedures    CRITICAL CARE TIME   none    PAST MEDICAL HISTORY      has a past medical history of Allergic rhinitis, Aortic regurgitation (2017), Arthritis, Asthma, Depression (2007), Diabetes mellitus ([facility] (1978), Diabetic peripheral neuropathy ([facility] Fibromyalgia, Hyperlipidemia, Hypertension, Hypothyroid (1980), Nervous breakdown (1954), Sphenoid sinusitis, Stage 3 chronic kidney disease ([facility] (6/27/2019), and Vertigo.      EMERGENCY DEPARTMENT COURSE and DIFFERENTIAL DIAGNOSIS/MDM:   Vitals:    Vitals:    03/08/23 1535 03/08/23 1651 03/08/23 1755   BP: (!) 161/66 (!) 168/61 (!) 121/99   Pulse: 68 51 61   Resp: 15 13 22   Temp: 97.7 °F (36.5 °C)     TempSrc: Oral     SpO2: 98% 95% 95%   Weight: 172 lb (78 kg)     Height: 5' 3\" (1.6 m)         Patient was given the following medications:  Medications - No data to display    ED Course as of 03/08/23 1911   Wed Mar 08, 2023   1852 D/w dr Debra Swartz will admit for dr Yeimy Tanner [LINDSAY]      ED Course User Index  [LINDSAY] Ashvin Li DO        [unfilled]    Chronic Conditions: Active Ambulatory Problems     Diagnosis Date Noted    Diabetes mellitus ([facility] 01/01/1978    Hypothyroid 01/01/1980    Depression 01/01/2007    Aortic regurgitation 01/01/2017    Vertigo     Stage 3 chronic kidney disease ([facility] 06/27/2019    Nervous breakdown 06/27/2019    Asthma     Allergic rhinitis     Sphenoid sinusitis     Diabetic peripheral neuropathy ([facility]      Resolved Ambulatory Problems     Diagnosis Date Noted    No Resolved Ambulatory Problems     Past Medical History:   Diagnosis Date    Arthritis     Fibromyalgia     Hyperlipidemia     Hypertension          CONSULTS: (Who and What was discussed)  IP CONSULT TO SOCIAL WORK  IP CONSULT TO INTERNAL MEDICINE    Discussion with Other Profesionals : Admitting Team dr Fide Mccarthy for dr Kingsley Boucher, dr Raza Cano son in law    Social Determinants : elderly and lives alone, dementia    Records Reviewed : Outpatient Notes previous notes    CC/HPI Summary, DDx, ED Course, and Reassessment:-year-old female presenting for increased confusion fall and lower extremity pain. Differential diagnosis includes UTI, ICH, pneumonia, worsening dementia to name a few. Patient had comprehensive physical exam which was fairly unremarkable except for patient only oriented x2. She had no focal weakness on neurological exam.  No evidence of CVA on physical exam.  She had extensive testing which included blood work x-rays and advanced imaging advanced imaging showed no acute CVA or ICH x-ray showed no pneumonia or acute fractures. Laboratory testing and urinalysis was frequently unremarkable. Everything was reviewed with the family who would like her admitted and have social work and physical therapy evaluate her for possible placement they are concerned that she is not safe to be home alone especially due to the fact that she is confused. She has had multiple falls. They are agreeable with admission.     Disposition Considerations (tests considered but not done, Shared Decision Making, Pt Expectation of Test or Tx.):   admit      I am the Primary Clinician of Record. FINAL IMPRESSION      1. Altered mental status, unspecified altered mental status type    2. Lives alone    3. Frequent falls          DISPOSITION/PLAN     DISPOSITION Admitted 03/08/2023 06:52:39 PM      PATIENT REFERRED TO:  No follow-up provider specified.     DISCHARGE MEDICATIONS:  New Prescriptions    No medications on file       DISCONTINUED MEDICATIONS:  Discontinued Medications    No medications on file              (Please note that portions of this note were completed with a voice recognition program.  Efforts were made to edit the dictations but occasionally words are mis-transcribed.)    Trevor Castleman, DO (electronically signed)           Trevor Castleman, DO  03/08/23 9817

## 2023-03-09 VITALS
OXYGEN SATURATION: 95 % | DIASTOLIC BLOOD PRESSURE: 68 MMHG | WEIGHT: 170.8 LBS | HEIGHT: 63 IN | HEART RATE: 51 BPM | BODY MASS INDEX: 30.26 KG/M2 | SYSTOLIC BLOOD PRESSURE: 164 MMHG | TEMPERATURE: 98 F | RESPIRATION RATE: 18 BRPM

## 2023-03-09 LAB
ANION GAP SERPL CALCULATED.3IONS-SCNC: 11 MMOL/L (ref 7–16)
BASOPHILS ABSOLUTE: 0.06 E9/L (ref 0–0.2)
BASOPHILS RELATIVE PERCENT: 0.6 % (ref 0–2)
BUN BLDV-MCNC: 14 MG/DL (ref 6–23)
CALCIUM SERPL-MCNC: 8.7 MG/DL (ref 8.6–10.2)
CHLORIDE BLD-SCNC: 102 MMOL/L (ref 98–107)
CO2: 27 MMOL/L (ref 22–29)
CREAT SERPL-MCNC: 0.9 MG/DL (ref 0.5–1)
EKG ATRIAL RATE: 50 BPM
EKG ATRIAL RATE: 51 BPM
EKG P AXIS: 59 DEGREES
EKG P AXIS: 69 DEGREES
EKG P-R INTERVAL: 292 MS
EKG P-R INTERVAL: 304 MS
EKG Q-T INTERVAL: 438 MS
EKG Q-T INTERVAL: 474 MS
EKG QRS DURATION: 100 MS
EKG QRS DURATION: 76 MS
EKG QTC CALCULATION (BAZETT): 399 MS
EKG QTC CALCULATION (BAZETT): 436 MS
EKG R AXIS: -40 DEGREES
EKG R AXIS: -46 DEGREES
EKG T AXIS: 1 DEGREES
EKG T AXIS: 5 DEGREES
EKG VENTRICULAR RATE: 50 BPM
EKG VENTRICULAR RATE: 51 BPM
EOSINOPHILS ABSOLUTE: 0.1 E9/L (ref 0.05–0.5)
EOSINOPHILS RELATIVE PERCENT: 1 % (ref 0–6)
GFR SERPL CREATININE-BSD FRML MDRD: >60 ML/MIN/1.73
GLUCOSE BLD-MCNC: 128 MG/DL (ref 74–99)
HBA1C MFR BLD: 5.7 % (ref 4–5.6)
HCT VFR BLD CALC: 33.3 % (ref 34–48)
HEMOGLOBIN: 11.5 G/DL (ref 11.5–15.5)
IMMATURE GRANULOCYTES #: 0.02 E9/L
IMMATURE GRANULOCYTES %: 0.2 % (ref 0–5)
LYMPHOCYTES ABSOLUTE: 3.72 E9/L (ref 1.5–4)
LYMPHOCYTES RELATIVE PERCENT: 38.8 % (ref 20–42)
MCH RBC QN AUTO: 31.4 PG (ref 26–35)
MCHC RBC AUTO-ENTMCNC: 34.5 % (ref 32–34.5)
MCV RBC AUTO: 91 FL (ref 80–99.9)
METER GLUCOSE: 114 MG/DL (ref 74–99)
METER GLUCOSE: 145 MG/DL (ref 74–99)
MONOCYTES ABSOLUTE: 0.69 E9/L (ref 0.1–0.95)
MONOCYTES RELATIVE PERCENT: 7.2 % (ref 2–12)
NEUTROPHILS ABSOLUTE: 5.01 E9/L (ref 1.8–7.3)
NEUTROPHILS RELATIVE PERCENT: 52.2 % (ref 43–80)
PDW BLD-RTO: 13.2 FL (ref 11.5–15)
PLATELET # BLD: 190 E9/L (ref 130–450)
PMV BLD AUTO: 12.1 FL (ref 7–12)
POTASSIUM REFLEX MAGNESIUM: 3.6 MMOL/L (ref 3.5–5)
RBC # BLD: 3.66 E12/L (ref 3.5–5.5)
SODIUM BLD-SCNC: 140 MMOL/L (ref 132–146)
WBC # BLD: 9.6 E9/L (ref 4.5–11.5)

## 2023-03-09 PROCEDURE — 97161 PT EVAL LOW COMPLEX 20 MIN: CPT

## 2023-03-09 PROCEDURE — 36415 COLL VENOUS BLD VENIPUNCTURE: CPT

## 2023-03-09 PROCEDURE — 93010 ELECTROCARDIOGRAM REPORT: CPT | Performed by: INTERNAL MEDICINE

## 2023-03-09 PROCEDURE — 97165 OT EVAL LOW COMPLEX 30 MIN: CPT

## 2023-03-09 PROCEDURE — 83036 HEMOGLOBIN GLYCOSYLATED A1C: CPT

## 2023-03-09 PROCEDURE — 97535 SELF CARE MNGMENT TRAINING: CPT

## 2023-03-09 PROCEDURE — 80048 BASIC METABOLIC PNL TOTAL CA: CPT

## 2023-03-09 PROCEDURE — 82962 GLUCOSE BLOOD TEST: CPT

## 2023-03-09 PROCEDURE — 6370000000 HC RX 637 (ALT 250 FOR IP)

## 2023-03-09 PROCEDURE — 2580000003 HC RX 258

## 2023-03-09 PROCEDURE — 85025 COMPLETE CBC W/AUTO DIFF WBC: CPT

## 2023-03-09 RX ORDER — METHYLPREDNISOLONE 4 MG/1
TABLET ORAL
Qty: 1 KIT | Refills: 0 | Status: SHIPPED | OUTPATIENT
Start: 2023-03-09 | End: 2023-03-15

## 2023-03-09 RX ORDER — AZITHROMYCIN 250 MG/1
250 TABLET, FILM COATED ORAL SEE ADMIN INSTRUCTIONS
Qty: 6 TABLET | Refills: 0 | Status: SHIPPED | OUTPATIENT
Start: 2023-03-09 | End: 2023-03-14

## 2023-03-09 RX ADMIN — BUSPIRONE HYDROCHLORIDE 10 MG: 5 TABLET ORAL at 08:48

## 2023-03-09 RX ADMIN — GABAPENTIN 100 MG: 100 CAPSULE ORAL at 08:49

## 2023-03-09 RX ADMIN — CETIRIZINE HYDROCHLORIDE 10 MG: 10 TABLET, FILM COATED ORAL at 08:49

## 2023-03-09 RX ADMIN — PAROXETINE 10 MG: 10 TABLET, FILM COATED ORAL at 08:49

## 2023-03-09 RX ADMIN — SODIUM CHLORIDE, PRESERVATIVE FREE 10 ML: 5 INJECTION INTRAVENOUS at 08:49

## 2023-03-09 RX ADMIN — GLIPIZIDE 5 MG: 5 TABLET ORAL at 06:30

## 2023-03-09 RX ADMIN — SUCRALFATE 1 G: 1 TABLET ORAL at 08:48

## 2023-03-09 RX ADMIN — BUMETANIDE 0.5 MG: 1 TABLET ORAL at 08:48

## 2023-03-09 NOTE — H&P
History and Physical      CHIEF COMPLAINT:  altered mental status, fall at home    History of Present Illness: Remberto Tong is a pleasant 80 y.o. female w/ PMH of allergic rhinitis, aortic regurg, arthritis, asthma, depression, type 2 dm, diabetic peripheral neuropathy, fibromyalgia, hyperlipidemia, HTN, hypothyroidism, hx of nervous breakdown, sphenoid sinusitis, stage 3 ckd and vertigo who presented to the ED on 3/8/2023 for fall at home. pt lives alone and was found on floor at home and bought via ems, unknown how long pt was down, pt notes doesn't remember fall, but complains of chronic dizziness related to sinus issues. In the ED, pt was noted to have neg covid. Unremarkable cbc. Troponin 24. Electrolytes and CK wnl/unremarkable. Lactic acid 1.2. lipase 21. Tsh 2.110. urinalysis negative. EKG showed sinus alice w/ 1st deg av block. Xrays done were neg for acute process. Ct head neg. Ct cervical spine showed degen changes. Workup all in all unremarkable. Patient was admitted for further assessment and management pending evaluation for possible placement.       Past Medical History:   Diagnosis Date    Allergic rhinitis     Most of her life until recently; under care of allergist    Aortic regurgitation 2017    Arthritis     Asthma     Depression 2007    Diabetes mellitus ([address] 1978    Diabetic peripheral neuropathy ([address]     Fibromyalgia     Hyperlipidemia     Hypertension     Hypothyroid 1980    Nervous breakdown 1954    Treated by psychiatry, Valium much improvement    Sphenoid sinusitis     By CT 6/2019    Stage 3 chronic kidney disease ([address] 6/27/2019    Vertigo          Past Surgical History:   Procedure Laterality Date    APPENDECTOMY      CATARACT REMOVAL Left     CHOLECYSTECTOMY  1982    LAP SONNY    HYSTERECTOMY (CERVIX STATUS UNKNOWN)         Medications Prior to Admission:    Medications Prior to Admission: montelukast (SINGULAIR) 10 MG tablet, Take 10 mg by mouth nightly  PARoxetine (PAXIL) 10 MG tablet, Take 10 mg by mouth daily  allopurinol (ZYLOPRIM) 100 MG tablet, Take 100 mg by mouth daily (Patient not taking: Reported on 3/8/2023)  zolpidem (AMBIEN) 10 MG tablet, Take 10 mg by mouth nightly as needed for Sleep.  mometasone (ASMANEX HFA) 100 MCG/ACT AERO inhaler, Inhale 2 puffs into the lungs 2 times daily (Patient not taking: Reported on 3/8/2023)  metFORMIN (GLUCOPHAGE) 500 MG tablet, Take 500 mg by mouth daily (with breakfast)  VITAMIN D PO, Take 1,250 mcg by mouth daily (Patient not taking: Reported on 3/8/2023)  amLODIPine (NORVASC) 2.5 MG tablet, Take 1 tablet by mouth daily (Patient not taking: Reported on 3/8/2023)  sodium chloride (OCEAN, BABY AYR) 0.65 % nasal spray, 1 spray by Nasal route as needed for Congestion  mirtazapine (REMERON) 15 MG tablet, Take 1 tablet by mouth nightly (Patient not taking: Reported on 3/8/2023)  albuterol (ACCUNEB) 0.63 MG/3ML nebulizer solution, Take 1 ampule by nebulization every 12 hours as needed for Wheezing (Patient not taking: Reported on 3/8/2023)  busPIRone (BUSPAR) 10 MG tablet, Take 10 mg by mouth 2 times daily  bumetanide (BUMEX) 0.5 MG tablet, Take 0.5 mg by mouth daily  loratadine (CLARITIN) 10 MG capsule, Take 10 mg by mouth daily as needed  escitalopram (LEXAPRO) 20 MG tablet, Take 20 mg by mouth daily (Patient not taking: Reported on 3/8/2023)  gabapentin (NEURONTIN) 100 MG capsule, Take 100 mg by mouth 3 times daily.   glipiZIDE (GLUCOTROL) 5 MG tablet, Take 5 mg by mouth 2 times daily (before meals)  hydrOXYzine (ATARAX) 10 MG tablet, Take 10 mg by mouth nightly  L-Methylfolate-Algae-B12-B6 (METANX PO), Take 1 tablet by mouth 2 times daily (Patient not taking: Reported on 3/8/2023)  L-methylfolate-B6-B12 (METANX) 3-90.314-2-35 MG CAPS capsule, Take 1 capsule by mouth daily (Patient not taking: Reported on 3/8/2023)  famotidine (PEPCID) 20 MG tablet, Take 20 mg by mouth 2 times daily  sucralfate (CARAFATE) 1 GM tablet, Take 1 g by mouth 2 times daily (Patient not taking: Reported on 3/8/2023)  traMADol-Acetaminophen (ULTRACET PO), Take 37.5 mg by mouth 3 times daily Indications: half a tablet 3 times a day  albuterol sulfate HFA (PROVENTIL;VENTOLIN;PROAIR) 108 (90 Base) MCG/ACT inhaler, Inhale 2 puffs into the lungs daily as needed for Wheezing (Patient not taking: Reported on 3/8/2023)  Ergocalciferol (VITAMIN D2 PO), Take 50,000 Units by mouth (Patient not taking: Reported on 3/8/2023)    Allergies:    Sulfa antibiotics    Social History:    reports that she has never smoked. She has never used smokeless tobacco. She reports that she does not drink alcohol and does not use drugs. Family History:   family history includes Cancer in her father; Diabetes in her mother; Heart Disease in her mother. REVIEW OF SYSTEMS:  As above in the HPI, otherwise negative    PHYSICAL EXAM:    Vitals:  BP (!) 162/67   Pulse 60   Temp 97.7 °F (36.5 °C) (Oral)   Resp 16   Ht 5' 3\" (1.6 m)   Wt 170 lb 12.8 oz (77.5 kg)   SpO2 95%   BMI 30.26 kg/m²     General:  Awake, alert, oriented X 3. Well developed, well nourished, well groomed. No apparent distress. HEENT:  Normocephalic, atraumatic. Pupils equal, round, reactive to light. No scleral icterus. No conjunctival injection. Normal lips, teeth, and gums. No nasal discharge. Neck:  Supple  Heart:  RRR, no murmurs, gallops, or rubs  Lungs:  CTA bilaterally, bilat symmetrical expansion, no wheeze, rales, or rhonchi  Abdomen:   Bowel sounds present, soft, nontender, no masses, no organomegaly, no peritoneal signs  Extremities:  No clubbing, cyanosis, or edema  Skin:  Warm and dry, no open lesions or rash  Neuro:  Cranial nerves 2-12 intact, no focal deficits    LABS:  CBC with Differential:    Lab Results   Component Value Date/Time    WBC 9.6 03/09/2023 01:42 AM    RBC 3.66 03/09/2023 01:42 AM    HGB 11.5 03/09/2023 01:42 AM    HCT 33.3 03/09/2023 01:42 AM     03/09/2023 01:42 AM    MCV 91.0 03/09/2023 01:42 AM    MCH 31.4 03/09/2023 01:42 AM    MCHC 34.5 03/09/2023 01:42 AM    RDW 13.2 03/09/2023 01:42 AM    LYMPHOPCT 38.8 03/09/2023 01:42 AM    MONOPCT 7.2 03/09/2023 01:42 AM    BASOPCT 0.6 03/09/2023 01:42 AM    MONOSABS 0.69 03/09/2023 01:42 AM    LYMPHSABS 3.72 03/09/2023 01:42 AM    EOSABS 0.10 03/09/2023 01:42 AM    BASOSABS 0.06 03/09/2023 01:42 AM     CMP:    Lab Results   Component Value Date/Time     03/09/2023 01:42 AM    K 3.6 03/09/2023 01:42 AM     03/09/2023 01:42 AM    CO2 27 03/09/2023 01:42 AM    BUN 14 03/09/2023 01:42 AM    CREATININE 0.9 03/09/2023 01:42 AM    GFRAA >60 06/28/2019 05:15 AM    LABGLOM >60 03/09/2023 01:42 AM    GLUCOSE 128 03/09/2023 01:42 AM    PROT 7.3 03/08/2023 03:46 PM    LABALBU 4.0 03/08/2023 03:46 PM    CALCIUM 8.7 03/09/2023 01:42 AM    BILITOT 0.4 03/08/2023 03:46 PM    ALKPHOS 49 03/08/2023 03:46 PM    AST 29 03/08/2023 03:46 PM    ALT 15 03/08/2023 03:46 PM         ASSESSMENT:      Patient Active Problem List   Diagnosis    Diabetes mellitus ([facility]    Hypothyroid    Depression    Aortic regurgitation    Vertigo    Stage 3 chronic kidney disease (HCC)    Nervous breakdown    Asthma    Allergic rhinitis    Sphenoid sinusitis    Diabetic peripheral neuropathy ([facility]    Altered mental status       PLAN:    AMS, fall at home  --PT/OT consult  --social work for d/c planning  --fall precautions  --orthostatics ordered  --monitor vitals    Continue to manage stable chronic conditions with at home medications as prescribed, where appropriate.     PT/OT  DVT/PE prophylaxis  Social work for d/c planning  Code Full        Please note that over 50 minutes was spent in evaluating the patient, review of records and results, discussion with staff/family, etc.    Jaguar Childs DO  6:55 AM  3/9/2023

## 2023-03-09 NOTE — DISCHARGE SUMMARY
[facility]   Discharge summary   Patient ID:  Aby Snow  [id number]  80 y.o.  1/13/1933    Admit date: 3/8/2023    Discharge date and time: 3/9/2023    Admission Diagnoses:   Patient Active Problem List   Diagnosis    Diabetes mellitus ([facility]    Hypothyroid    Depression    Aortic regurgitation    Vertigo    Stage 3 chronic kidney disease (HCC)    Nervous breakdown    Asthma    Allergic rhinitis    Sphenoid sinusitis    Diabetic peripheral neuropathy ([facility]    Altered mental status       Discharge Diagnoses: ***    Consults: {consultation:54489}    Procedures: ***    Hospital Course: The patient is a 80 y.o. female of Lloyd Ramirez MD with significant past medical history of *** who presents with ***    Recent Labs     03/08/23  1546 03/09/23  0142   WBC 9.0 9.6   HGB 12.6 11.5   HCT 37.4 33.3*    190       Recent Labs     03/08/23  1546 03/09/23  0142    140   K 4.0 3.6    102   CO2 29 27   BUN 12 14   CREATININE 0.9 0.9   CALCIUM 9.1 8.7       XR FEMUR LEFT (MIN 2 VIEWS)    Result Date: 3/8/2023  EXAMINATION: XRAY VIEWS OF THE LEFT FEMUR 3/8/2023 4:30 pm COMPARISON: None. HISTORY: ORDERING SYSTEM PROVIDED HISTORY: pain TECHNOLOGIST PROVIDED HISTORY: Reason for exam:->pain FINDINGS: There is no evidence of acute fracture. There is normal alignment. No acute joint abnormality. No focal osseous lesion. No focal soft tissue abnormality. No acute osseous abnormality. XR KNEE LEFT (MIN 4 VIEWS)    Result Date: 3/8/2023  EXAMINATION: FOUR XRAY VIEWS OF THE LEFT KNEE 3/8/2023 4:30 pm COMPARISON: None. HISTORY: ORDERING SYSTEM PROVIDED HISTORY: pain TECHNOLOGIST PROVIDED HISTORY: Reason for exam:->pain FINDINGS: No evidence of acute fracture or dislocation. No focal osseous lesion. No evidence of joint effusion. No focal soft tissue abnormality. Small superior patellar spur. No acute abnormality of the knee. Small superior patellar spur.      CT HEAD WO CONTRAST    Result Date: 3/8/2023  EXAMINATION: CT OF THE HEAD WITHOUT CONTRAST; CT OF THE CERVICAL SPINE WITHOUT CONTRAST 3/8/2023 3:52 pm TECHNIQUE: CT of the head was performed without the administration of intravenous contrast. Automated exposure control, iterative reconstruction, and/or weight based adjustment of the mA/kV was utilized to reduce the radiation dose to as low as reasonably achievable.; CT of the cervical spine was performed without the administration of intravenous contrast. Multiplanar reformatted images are provided for review. Automated exposure control, iterative reconstruction, and/or weight based adjustment of the mA/kV was utilized to reduce the radiation dose to as low as reasonably achievable. COMPARISON: None. HISTORY: ORDERING SYSTEM PROVIDED HISTORY: fall, altered mental state TECHNOLOGIST PROVIDED HISTORY: Has a \"code stroke\" or \"stroke alert\" been called? ->No Reason for exam:->fall, altered mental state Decision Support Exception - unselect if not a suspected or confirmed emergency medical condition->Emergency Medical Condition (MA) FINDINGS: CT OF THE BRAIN: BRAIN/VENTRICLES: No mass effect, edema or hemorrhage is seen. Mild volume loss is seen in the cerebrum with mild chronic microvascular ischemic changes. No hydrocephalus or extra-axial fluid is seen. ORBITS: The visualized portion of the orbits demonstrate no acute abnormality. SINUSES: An air-fluid level is seen in the dominant right sphenoid sinus. The remainder of the paranasal sinuses are essentially clear. Opacification of 2 tiny right mastoid air cells. .  The mastoid air cells are otherwise grossly clear. SOFT TISSUES/SKULL: No acute abnormality of the visualized skull or soft tissues. CT CERVICAL SPINE: BONES/ALIGNMENT: There is no acute fracture or traumatic malalignment. DEGENERATIVE CHANGES: Small disc bulges are noted from C3-4 to C7-T1. Mild central canal stenosis at C3-4.   Multilevel neural foraminal stenoses, worst (moderate) at the right C3-4 level. SOFT TISSUES: There is no prevertebral soft tissue swelling. CT HEAD WITHOUT CONTRAST: 1. No skull fracture or acute intracranial abnormality. CT CERVICAL SPINE WITHOUT CONTRAST: 1. No fracture or joint dislocation is seen. 2. Degenerative changes, as described. CT CERVICAL SPINE WO CONTRAST    Result Date: 3/8/2023  EXAMINATION: CT OF THE HEAD WITHOUT CONTRAST; CT OF THE CERVICAL SPINE WITHOUT CONTRAST 3/8/2023 3:52 pm TECHNIQUE: CT of the head was performed without the administration of intravenous contrast. Automated exposure control, iterative reconstruction, and/or weight based adjustment of the mA/kV was utilized to reduce the radiation dose to as low as reasonably achievable.; CT of the cervical spine was performed without the administration of intravenous contrast. Multiplanar reformatted images are provided for review. Automated exposure control, iterative reconstruction, and/or weight based adjustment of the mA/kV was utilized to reduce the radiation dose to as low as reasonably achievable. COMPARISON: None. HISTORY: ORDERING SYSTEM PROVIDED HISTORY: fall, altered mental state TECHNOLOGIST PROVIDED HISTORY: Has a \"code stroke\" or \"stroke alert\" been called? ->No Reason for exam:->fall, altered mental state Decision Support Exception - unselect if not a suspected or confirmed emergency medical condition->Emergency Medical Condition (MA) FINDINGS: CT OF THE BRAIN: BRAIN/VENTRICLES: No mass effect, edema or hemorrhage is seen. Mild volume loss is seen in the cerebrum with mild chronic microvascular ischemic changes. No hydrocephalus or extra-axial fluid is seen. ORBITS: The visualized portion of the orbits demonstrate no acute abnormality. SINUSES: An air-fluid level is seen in the dominant right sphenoid sinus. The remainder of the paranasal sinuses are essentially clear. Opacification of 2 tiny right mastoid air cells. .  The mastoid air cells are otherwise grossly clear. SOFT TISSUES/SKULL: No acute abnormality of the visualized skull or soft tissues. CT CERVICAL SPINE: BONES/ALIGNMENT: There is no acute fracture or traumatic malalignment. DEGENERATIVE CHANGES: Small disc bulges are noted from C3-4 to C7-T1. Mild central canal stenosis at C3-4. Multilevel neural foraminal stenoses, worst (moderate) at the right C3-4 level. SOFT TISSUES: There is no prevertebral soft tissue swelling. CT HEAD WITHOUT CONTRAST: 1. No skull fracture or acute intracranial abnormality. CT CERVICAL SPINE WITHOUT CONTRAST: 1. No fracture or joint dislocation is seen. 2. Degenerative changes, as described. XR CHEST PORTABLE    Result Date: 3/8/2023  EXAMINATION: ONE XRAY VIEW OF THE CHEST 3/8/2023 4:30 pm COMPARISON: 03/01/2021 HISTORY: ORDERING SYSTEM PROVIDED HISTORY: fall, altered mental state TECHNOLOGIST PROVIDED HISTORY: Reason for exam:->fall, altered mental state FINDINGS: The lungs are without acute focal process. There is no effusion or pneumothorax. The cardiomediastinal silhouette is without acute process. The osseous structures are without acute process. No acute process. Discharge Exam:    HEENT: NCAT,  PERRLA, No JVD  Heart:  RRR, no murmurs, gallops, or rubs. Lungs:  CTA bilaterally, no wheeze, rales or rhonchi  Abd: bowel sounds present, nontender, nondistended, no masses  Extrem:  No clubbing, cyanosis, or edema    Disposition: {disposition:66870}     Patient Condition at Discharge: ***    Patient Instructions:      Medication List        START taking these medications      azithromycin 250 MG tablet  Commonly known as: ZITHROMAX  Take 1 tablet by mouth See Admin Instructions for 5 days 500mg on day 1 followed by 250mg on days 2 - 5     methylPREDNISolone 4 MG tablet  Commonly known as: MEDROL DOSEPACK  Take by mouth.             CONTINUE taking these medications      bumetanide 0.5 MG tablet  Commonly known as: BUMEX     busPIRone 10 MG tablet  Commonly known as: BUSPAR     famotidine 20 MG tablet  Commonly known as: PEPCID     gabapentin 100 MG capsule  Commonly known as: NEURONTIN     glipiZIDE 5 MG tablet  Commonly known as: GLUCOTROL     hydrOXYzine HCl 10 MG tablet  Commonly known as: ATARAX     loratadine 10 MG capsule  Commonly known as: CLARITIN     metFORMIN 500 MG tablet  Commonly known as: GLUCOPHAGE     montelukast 10 MG tablet  Commonly known as: SINGULAIR     PARoxetine 10 MG tablet  Commonly known as: PAXIL     sodium chloride 0.65 % nasal spray  Commonly known as: OCEAN, BABY AYR  1 spray by Nasal route as needed for Congestion     ULTRACET PO     zolpidem 10 MG tablet  Commonly known as: AMBIEN            STOP taking these medications      albuterol 0.63 MG/3ML nebulizer solution  Commonly known as: ACCUNEB     albuterol sulfate  (90 Base) MCG/ACT inhaler  Commonly known as: PROVENTIL;VENTOLIN;PROAIR     allopurinol 100 MG tablet  Commonly known as: ZYLOPRIM     amLODIPine 2.5 MG tablet  Commonly known as: NORVASC     Asmanex  MCG/ACT Aero inhaler  Generic drug: mometasone     escitalopram 20 MG tablet  Commonly known as: LEXAPRO     L-methylfolate-B6-B12 3-90.314-2-35 MG Caps capsule     METANX PO     mirtazapine 15 MG tablet  Commonly known as: REMERON     sucralfate 1 GM tablet  Commonly known as: CARAFATE     VITAMIN D PO     VITAMIN D2 PO               Where to Get Your Medications        These medications were sent to [address] [place] [address],[address], [facility] [phone] [facility] [phone]   [place] [address]      Phone: [phone]   azithromycin 250 MG tablet  methylPREDNISolone 4 MG tablet       Activity: {discharge activity:95702}  Diet: {diet:29204}    Pt has been advised to: Follow-up with Torrence Dancer, MD in 1 week.   Follow-up with consultants as recommended by them    Note that over 30 minutes was spent in preparing discharge papers, discussing discharge with patient, medication review, etc.    Signed:  Shira Almendarez DO  3/9/2023  11:10 AM

## 2023-03-09 NOTE — PROGRESS NOTES
Reviewed patient's meds with daughter and son-in-law over the phone. Spoke with IDALMIS Barboza regarding what was different that she is taking now. Said ok to make the modifications to her orders.

## 2023-03-09 NOTE — DISCHARGE INSTRUCTIONS
Your information:  Name: Faisal Desir  : 1933        What to do after you leave the hospital:    Recommended diet: regular diet    Recommended activity: activity as tolerated        The following personal items were collected during your admission and were returned to you:    Belongings  Dental Appliances: Uppers, Lowers  Vision - Corrective Lenses: None  Hearing Aid: None  Clothing: Pajamas  Jewelry: Ring  Body Piercings Removed: N/A  Electronic Devices: None  Weapons (Notify Protective Services/Security): None  Other Valuables: At home  Home Medications: None  Valuables Given To: Patient  Provide Name(s) of Who Valuable(s) Were Given To: patient  Responsible person(s) in the waiting room: n/a  Patient approves for provider to speak to responsible person post operatively: Yes    Information obtained by:  By signing below, I understand that if any problems occur once I leave the hospital I am to contact My primary care provider.  I understand and acknowledge receipt of the instructions indicated above.

## 2023-03-09 NOTE — PROGRESS NOTES
Occupational Therapy    OCCUPATIONAL THERAPY INITIAL EVALUATION    [facility] Marta Deng [facility], [place]         Date:3/9/2023                                                  Patient Name: Remberto Tong    MRN: [id number]    : 1933    Room: [address] [place] [address]      Evaluating OT: Juan Miguel Harrington OTR/L   [id number]      Referring IDALMIS Soliman CNP    Specific Provider Orders/Date:OT eval and treat 3/8/2023      Diagnosis:  Altered mental status [R41.82]  Frequent falls [R29.6]  Altered mental status, unspecified altered mental status type [R41.82]  Lives alone [Z60.2]     Pertinent Medical History: asthma, neuropathy, vertigo      Precautions:  Fall Risk,      Assessment of current deficits    [x] Functional mobility  [x]ADLs  [x] Strength               []Cognition    [x] Functional transfers   [x] IADLs         [x] Safety Awareness   [x]Endurance    [] Fine Coordination              [x] Balance      [] Vision/perception   []Sensation     []Gross Motor Coordination  [] ROM  [] Delirium                   [] Motor Control     OT PLAN OF CARE   OT POC based on physician orders, patient diagnosis and results of clinical assessment    Frequency/Duration  2-4 days/wk for 2 weeks PRN   Specific OT Treatment Interventions to include:   ADL retraining/adapted techniques and AE recommendations to increase functional independence within precautions                    Energy conservation techniques to improve tolerance for selfcare routine   Functional transfer/mobility training/DME recommendations for increased independence, safety and fall prevention         Patient/family education to increase safety and functional independence             Environmental modifications for safe mobility and completion of ADLs                             Therapeutic activity to improve functional performance during ADLs.                                          Therapeutic exercise to improve tolerance and functional strength for ADLs    Balance retraining/tolerance tasks for facilitation of postural control with dynamic challenges during ADLs .       Positioning to improve functional independence  []    Recommended Adaptive Equipment: wheeled walker     Home Living: Pt lives alone, 1 floor with 3 steps to enter    Bathroom setup: [address] owned: shower seat     Prior Level of Function: Independent  with ADLs , assist as needed  with IADLs; ambulated with no device     Pain Level: no pain this session ;   Cognition: A&O: pleasant, following commands, conversing    Memory:  forgetful    Sequencing:  fair+   Problem solving:  fair+   Judgement/safety:  fair      Functional Assessment:  AM-PAC Daily Activity Raw Score: 17/24   Initial Eval Status  Date: 3/9/2023 Treatment Status  Date: STGs = LTGs  Time frame: 10-14 days   Feeding Independent      Grooming SBA  Standing at sink washing hands   Mod I    UB Dressing SBA  Mod I    LB Dressing Min A   Mod I    Bathing Min A   Mod I    Toileting SBA   Independent    Bed Mobility  Upon entry in chair   Supervision   Sit- supine  Patient got into bed forward facing with lifting her knee onto bed   Independent    Functional Transfers SBA   Sit - stand from chair, commode   Mod I    Functional Mobility SBA,w/walker   Ambulated to/from bathroom , and in room   Mod I  with good tolerance    Balance Sitting:     Static:  independent     Dynamic:SBA   Standing: SBA   Independent    Activity Tolerance No SOB   Good  with ADL activity    Visual/  Perceptual Glasses: none by bedside                Hand Dominance right   AROM (PROM) Strength Additional Info:    RUE  WFL WFL good  and wfl FMC/dexterity noted during ADL tasks       LUE WFL WFL good  and wfl FMC/dexterity noted during ADL tasks       Hearing: WFL  Sensation:  No c/o numbness or tingling   Tone: WFL   Edema: none observed     Comments: Upon arrival patient sitting in chair . At end of session, patient lying in bed  with call light and phone within reach, all lines and tubes intact. *ALARM ON     Overall patient demonstrated  decreased independence and safety during completion of ADL/functional transfer/mobility tasks. Pt would benefit from continued skilled OT to increase safety and independence with completion of ADL/IADL tasks for functional independence and quality of life. Comments/Treatment:     Patient practiced and was instructed on following during evaluation and OT session:          -Bed mobility - technique and safety         -Tranfers - hand placement, tech and safety         -Mobility - safety, and tech with  a device         -ADLs - tech, AE if appropriate/needed           -Educatio, importance of OOB activity and participating in ADLs, safety awareness             Rehab Potential: good for established goals     Patient / Family Goal: return home       Patient and/or family were instructed on functional diagnosis, prognosis/goals and OT plan of care. Demonstrated fair+ understanding. Eval Complexity: Low    Time In: 1320  Time Out: 1345  Total Treatment Time: 8    Min Units   OT Eval Low 97165 x  1   OT Eval Medium 56689      OT Eval High 67315      OT Re-Eval [id number]       Therapeutic Ex 03300      Therapeutic Activities 05687       ADL/Self Care 94802 10  1   Orthotic Management 57372       Manual 89043     Neuro Re-Ed 20062       Non-Billable Time         Evaluation Time additionally includes thorough review of current medical information, gathering information on past medical history/social history and prior level of function, interpretation of standardized testing/informal observation of tasks, assessment of data and development of plan of care and goals.             Talia Pepper  OTR/L  OT [id number]

## 2023-03-09 NOTE — PLAN OF CARE
Problem: Safety - Adult  Goal: Free from fall injury  3/9/2023 1242 by Dominick Rojas RN  Outcome: Progressing  Flowsheets (Taken 3/9/2023 0930)  Free From Fall Injury: Instruct family/caregiver on patient safety  3/8/2023 2326 by Jair Sales  Outcome: Progressing     Problem: ABCDS Injury Assessment  Goal: Absence of physical injury  Recent Flowsheet Documentation  Taken 3/9/2023 0930 by Dominick Rojas RN  Absence of Physical Injury: Implement safety measures based on patient assessment  3/8/2023 2326 by Jair Sales  Outcome: Progressing     Problem: Chronic Conditions and Co-morbidities  Goal: Patient's chronic conditions and co-morbidity symptoms are monitored and maintained or improved  Outcome: Progressing  Flowsheets (Taken 3/9/2023 0900)  Care Plan - Patient's Chronic Conditions and Co-Morbidity Symptoms are Monitored and Maintained or Improved:   Monitor and assess patient's chronic conditions and comorbid symptoms for stability, deterioration, or improvement   Collaborate with multidisciplinary team to address chronic and comorbid conditions and prevent exacerbation or deterioration

## 2023-03-09 NOTE — PLAN OF CARE
Problem: Safety - Adult  Goal: Free from fall injury  3/9/2023 1242 by Tiera Olivas RN  Outcome: Progressing  Flowsheets (Taken 3/9/2023 0930)  Free From Fall Injury: Instruct family/caregiver on patient safety  3/8/2023 2326 by Zaki Boyle  Outcome: Progressing     Problem: ABCDS Injury Assessment  Goal: Absence of physical injury  Recent Flowsheet Documentation  Taken 3/9/2023 0930 by Tiera Olivas RN  Absence of Physical Injury: Implement safety measures based on patient assessment  3/8/2023 2326 by Zaki Boyle  Outcome: Progressing     Problem: Chronic Conditions and Co-morbidities  Goal: Patient's chronic conditions and co-morbidity symptoms are monitored and maintained or improved  Outcome: Progressing  Flowsheets (Taken 3/9/2023 0900)  Care Plan - Patient's Chronic Conditions and Co-Morbidity Symptoms are Monitored and Maintained or Improved:   Monitor and assess patient's chronic conditions and comorbid symptoms for stability, deterioration, or improvement   Collaborate with multidisciplinary team to address chronic and comorbid conditions and prevent exacerbation or deterioration

## 2023-03-09 NOTE — PATIENT CARE CONFERENCE
[facility] Quality Flow/Interdisciplinary Rounds Progress Note        Quality Flow Rounds held on March 9, 2023    Disciplines Attending:  Bedside Nurse, , , and Nursing Unit Leadership    Ruben Nix was admitted on 3/8/2023  3:26 PM    Anticipated Discharge Date:       Disposition:    Jose Angel Score:  Jose Angel Scale Score: 20    Readmission Risk              Risk of Unplanned Readmission:  14           Discussed patient goal for the day, patient clinical progression, and barriers to discharge.   The following Goal(s) of the Day/Commitment(s) have been identified:  Occupational Therapy - Obtain Consult Order and Physical Therapy - Obtain Consult Order      Sonali Hyde RN  March 9, 2023

## 2023-03-09 NOTE — PROGRESS NOTES
Perfect serve message sent to Dr. Yuliana Costa per daughter's request. Patient's daughter would like to discuss patient's discharge plan.     Electronically signed by Jhonny Arellano RN on 3/9/2023 at 10:14 AM

## 2023-03-09 NOTE — PROGRESS NOTES
Your patient is on a medication that requires a renal and/or weight dose adjustment. Renal/Body Weight Function Assessment:    Date Body Weight IBW  Adjusted BW SCr  CrCl Dialysis status   3/8/2023 170 lb 12.8 oz (77.5 kg)  Ideal body weight: 52.4 kg (115 lb 8.3 oz)  Adjusted ideal body weight: 62.4 kg (137 lb 10.1 oz) Serum creatinine: 0.9 mg/dL 03/08/23 1546  Estimated creatinine clearance: 41 mL/min N/A       Pharmacy has dose-adjusted the following medication(s):    Date Previous Order Adjusted Order   3/8/2023 Pepcid 20 mg bid Pepcid 20 mg daily       These changes were made per protocol according to the [facility] Renal Dosing Policy/ [facility] Pharmacist Anticoagulant Review. *Please note this dose may need readjusted if your patient's condition changes. Please contact pharmacy with any questions regarding these changes.     JOCELYNE Aguilera [facility] [place]  3/8/2023  8:34 PM

## 2023-03-09 NOTE — PROGRESS NOTES
Physical Therapy  Facility/Department: [facility] SURG  Physical Therapy Initial Assessment    Name: Pollo Holt  : 1933  MRN: [id number]  Date of Service: 3/9/2023    Attending Provider:  Letha Clay DO    Evaluating PT:  Lucero Angel. Phil Jimenez P.T. Room #:  16/[facility]  Diagnosis:  Altered mental status [R41.82]  Frequent falls [R29.6]  Altered mental status, unspecified altered mental status type [R41.82]  Lives alone [Z60.2]  Imaging: X-rays of LLE, and chest were negative for any acute process/abnormality  Precautions:  Falls, bed/chair alarm    SUBJECTIVE:    Pt lives alone in a 1 story home with 3-4 stairs and 1 rail to enter. Pt ambulated with no AD PTA. OBJECTIVE:   Initial Evaluation  Date: 3/9/23 Treatment Short Term/ Long Term   Goals   Was pt agreeable to Eval/treatment? yes     Does pt have pain? No c/o pain     Bed Mobility  Rolling: SBA  Supine to sit: MIN A  Sit to supine: NA  Scooting: MIN A  Independent    Transfers Sit to stand: MIN A  Stand to sit: MIN A  Stand pivot: MIN A   supervision   Ambulation   30 feet x 2 reps with no AD MIN A  200 feet with AAD if needed supervision   Stair negotiation: ascended and descended NA, pt fatigued with amb  4 steps with 1 rail SBA   AM-PAC 6 Clicks 10/35       BLE ROM is WFL. BLE strength is grossly 4/5. Sensation:  Pt denies numbness and tingling to extremities  Balance: sitting is supervision and standing with no AD is MIN A  Endurance: fair    ASSESSMENT:    Conditions Requiring Skilled Therapeutic Intervention:    [x]Decreased strength     []Decreased ROM  [x]Decreased functional mobility  [x]Decreased balance   [x]Decreased endurance   []Decreased posture  []Decreased sensation  []Decreased coordination   []Decreased vision  []Decreased safety awareness   []Increased pain       Comments:  Pt was found in bed and was agreeable to PT. She stood up and felt unsteady on her feet.   She walked with MIN A for balance and had to stop for short standing rest break and then walked back to her room and sat in chair. Pt was unsteady with her gait at this time and this makes her a risk for falls. Fatigue limited further amb at this time. Pt was left sitting up in chair with call light left by patient. Chair/bed alarm: chair alarm was re-activated. Pt's/ family goals   1. To go home. Patient and or family understand(s) diagnosis, prognosis, and plan of care. PHYSICAL THERAPY PLAN OF CARE:    PT POC is established based on physician order and patient diagnosis     Referring provider/PT Order:  PT eval and treat  Diagnosis:  Altered mental status [R41.82]  Frequent falls [R29.6]  Altered mental status, unspecified altered mental status type [R41.82]  Lives alone [Z60.2]  Specific instructions for next treatment:  increase amb distance as pt is able. Current Treatment Recommendations:     [x] Strengthening to improve independence with functional mobility   [] ROM to improve ROM and decrease spasm and pain which will help promote independence with functional mobility   [x] Balance Training to improve static/dynamic balance and to reduce fall risk  [x] Endurance Training to improve activity tolerance during functional mobility   [x] Transfer Training to improve safety and independence with all functional transfers   [x] Gait Training to improve gait mechanics, endurance and assess need for appropriate assistive device  [x] Stair Training in preparation for safe discharge home and/or into the community   [] Positioning to prevent skin breakdown and contractures  [] Safety and Education Training   [x] Patient/Caregiver Education   [] HEP  [] Other     PT long term treatment goals are located in above grid    Frequency of treatments: 2-5x/week x 1-2 weeks.     Time in  09:10  Time out  09:30    Evaluation Time includes thorough review of current medical information, gathering information on past medical history/social history and prior level of function, completion of standardized testing/informal observation of tasks, assessment of data and education on plan of care and goals. CPT codes:  [x] Low Complexity PT evaluation 76802  [] Moderate Complexity PT evaluation 86970  [] High Complexity PT evaluation 73263  [] PT Re-evaluation 88046  [] Gait training 31204 ** minutes  [] Manual therapy 50943 ** minutes  [] Therapeutic activities 96477 ** minutes  [] Therapeutic exercises 05619 ** minutes  [] Neuromuscular reeducation 91553 ** minutes     Amaury Bustamante., P.T.   License Number: [id number]

## 2023-03-09 NOTE — CARE COORDINATION
Social Work discharge planning    Sw spoke to pt and dtr [facility]. Plan is home. Gave them info and contact numbers for [employer]port, [place] [employer], and private duty list. They denied other needs at this time. Electronically signed by Berta Bradford on 3/9/2023 at 12:39 PM     Case Management Assessment  Initial Evaluation    Date/Time of Evaluation: 3/9/2023 12:39 PM  Assessment Completed by: Berta Bradford    If patient is discharged prior to next notation, then this note serves as note for discharge by case management. Patient Name: Ladan Garcia                   YOB: 1933  Diagnosis: Altered mental status [R41.82]  Frequent falls [R29.6]  Altered mental status, unspecified altered mental status type [R41.82]  Lives alone [Z60.2]                   Date / Time: 3/8/2023  3:26 PM    Patient Admission Status: Inpatient   Readmission Risk (Low < 19, Mod (19-27), High > 27): Readmission Risk Score: 9.4    Current PCP: Khloe Downing MD  PCP verified by CM? Yes    Chart Reviewed: Yes      History Provided by: Patient, Child/Family  Patient Orientation: Alert and Oriented    Patient Cognition: Alert    Hospitalization in the last 30 days (Readmission):  No    If yes, Readmission Assessment in CM Navigator will be completed. Advance Directives:      Code Status: Full Code   Patient's Primary Decision Maker is: Legal Next of Kin      Discharge Planning:    Patient lives with: Alone Type of Home: House  Primary Care Giver: Self  Patient Support Systems include: Children, Family Members   Current Financial resources:    Current community resources:    Current services prior to admission: None            Current DME:              Type of Home Care services:  None    ADLS  Prior functional level: Independent in ADLs/IADLs  Current functional level: Independent in ADLs/IADLs    PT AM-PAC:   /24  OT AM-PAC:   /24    Family can provide assistance at DC:  Yes  Would you like Case Management to discuss the discharge plan with any other family members/significant others, and if so, who? Yes  Plans to Return to Present Housing: Yes  Other Identified Issues/Barriers to RETURNING to current housing:   Potential Assistance needed at discharge: Dewayne Torres            Potential DME:    Patient expects to discharge to: [address] for transportation at discharge:      Financial    Payor: Rosalino Hutchins / Plan: [address] / Product Type: *No Product type* /     Does insurance require precert for SNF: Yes    Potential assistance Purchasing Medications: No  Meds-to-Beds request: No      [employer] [address], [facility] [phone] [facility] [phone] [address]  Phone: [phone] Fax: Wolf Farley 7961, [address]  [address]  [address] [place] [address]  Phone: [phone] Fax: [phone]      Notes:    Factors facilitating achievement of predicted outcomes: Family support, Cooperative, and Pleasant    Barriers to discharge: Medical complications    Additional Case Management Notes: The Plan for Transition of Care is related to the following treatment goals of Altered mental status [R41.82]  Frequent falls [R29.6]  Altered mental status, unspecified altered mental status type [R41.82]  Lives alone [C81.3]    IF APPLICABLE: The Patient and/or patient representative Sebastien and her family were provided with a choice of provider and agrees with the discharge plan. Freedom of choice list with basic dialogue that supports the patient's individualized plan of care/goals and shares the quality data associated with the providers was provided to:     Patient Representative Name:       The Patient and/or Patient Representative Agree with the Discharge Plan?       Shilo Christianson, [address]  Case Management Department  Ph: [phone] Fax: [phone]

## 2023-03-09 NOTE — PLAN OF CARE
Problem: Safety - Adult  Goal: Free from fall injury  Outcome: Progressing     Problem: ABCDS Injury Assessment  Goal: Absence of physical injury  Outcome: Progressing

## 2023-03-11 LAB — URINE CULTURE, ROUTINE: NORMAL

## 2023-06-01 DIAGNOSIS — L60.2 HYPERTROPHIC TOENAIL: Primary | ICD-10-CM

## 2023-08-10 DIAGNOSIS — B00.1 HERPES SIMPLEX LABIALIS: Primary | ICD-10-CM

## 2023-08-10 RX ORDER — VALACYCLOVIR HCL 500 MG
500 TABLET ORAL 2 TIMES DAILY
Qty: 20 TABLET | Refills: 1 | Status: SHIPPED
Start: 2023-08-10 | End: 2023-08-11

## 2023-08-10 RX ORDER — VALACYCLOVIR HCL 500 MG
500 TABLET ORAL 2 TIMES DAILY
Qty: 20 TABLET | Refills: 1
Start: 2023-08-10 | End: 2023-08-10

## 2023-08-11 DIAGNOSIS — B00.1 HERPES SIMPLEX LABIALIS: Primary | ICD-10-CM

## 2023-08-11 RX ORDER — VALACYCLOVIR HYDROCHLORIDE 500 MG/1
500 TABLET, FILM COATED ORAL 2 TIMES DAILY
Qty: 20 TABLET | Refills: 1 | Status: SHIPPED | OUTPATIENT
Start: 2023-08-11 | End: 2023-08-31